# Patient Record
Sex: MALE | Race: WHITE | NOT HISPANIC OR LATINO | URBAN - METROPOLITAN AREA
[De-identification: names, ages, dates, MRNs, and addresses within clinical notes are randomized per-mention and may not be internally consistent; named-entity substitution may affect disease eponyms.]

---

## 2022-11-22 ENCOUNTER — HOSPITAL ENCOUNTER (EMERGENCY)
Facility: HOSPITAL | Age: 77
Discharge: LEFT BEFORE DISCHARGE | End: 2022-11-23
Attending: SPECIALIST
Payer: MEDICARE

## 2022-11-22 DIAGNOSIS — R07.89 CHEST WALL PAIN: ICD-10-CM

## 2022-11-22 DIAGNOSIS — K85.90 ACUTE PANCREATITIS WITHOUT INFECTION OR NECROSIS, UNSPECIFIED PANCREATITIS TYPE: Primary | ICD-10-CM

## 2022-11-22 LAB
ALBUMIN SERPL-MCNC: 3.9 GM/DL (ref 3.4–4.8)
ALBUMIN/GLOB SERPL: 1.2 RATIO (ref 1.1–2)
ALP SERPL-CCNC: 110 UNIT/L (ref 40–150)
ALT SERPL-CCNC: 115 UNIT/L (ref 0–55)
AST SERPL-CCNC: 139 UNIT/L (ref 5–34)
BASOPHILS # BLD AUTO: 0.05 X10(3)/MCL (ref 0–0.2)
BASOPHILS NFR BLD AUTO: 0.3 %
BILIRUBIN DIRECT+TOT PNL SERPL-MCNC: 1.5 MG/DL
BUN SERPL-MCNC: 20 MG/DL (ref 8.4–25.7)
CALCIUM SERPL-MCNC: 9.5 MG/DL (ref 8.8–10)
CHLORIDE SERPL-SCNC: 106 MMOL/L (ref 98–107)
CO2 SERPL-SCNC: 24 MMOL/L (ref 23–31)
CREAT SERPL-MCNC: 1.38 MG/DL (ref 0.73–1.18)
EOSINOPHIL # BLD AUTO: 0.16 X10(3)/MCL (ref 0–0.9)
EOSINOPHIL NFR BLD AUTO: 0.9 %
ERYTHROCYTE [DISTWIDTH] IN BLOOD BY AUTOMATED COUNT: 13.5 % (ref 11.5–17)
GFR SERPLBLD CREATININE-BSD FMLA CKD-EPI: 53 MLS/MIN/1.73/M2
GLOBULIN SER-MCNC: 3.2 GM/DL (ref 2.4–3.5)
GLUCOSE SERPL-MCNC: 221 MG/DL (ref 82–115)
HCT VFR BLD AUTO: 44.8 % (ref 42–52)
HGB BLD-MCNC: 14.8 GM/DL (ref 14–18)
IMM GRANULOCYTES # BLD AUTO: 0.06 X10(3)/MCL (ref 0–0.04)
IMM GRANULOCYTES NFR BLD AUTO: 0.3 %
LYMPHOCYTES # BLD AUTO: 1.89 X10(3)/MCL (ref 0.6–4.6)
LYMPHOCYTES NFR BLD AUTO: 10.8 %
MCH RBC QN AUTO: 30.1 PG (ref 27–31)
MCHC RBC AUTO-ENTMCNC: 33 MG/DL (ref 33–36)
MCV RBC AUTO: 91.2 FL (ref 80–94)
MONOCYTES # BLD AUTO: 1.17 X10(3)/MCL (ref 0.1–1.3)
MONOCYTES NFR BLD AUTO: 6.7 %
NEUTROPHILS # BLD AUTO: 14.2 X10(3)/MCL (ref 2.1–9.2)
NEUTROPHILS NFR BLD AUTO: 81 %
PLATELET # BLD AUTO: 280 X10(3)/MCL (ref 130–400)
PMV BLD AUTO: 10 FL (ref 7.4–10.4)
POC CARDIAC TROPONIN I: 0 NG/ML (ref 0–0.08)
POTASSIUM SERPL-SCNC: 3.9 MMOL/L (ref 3.5–5.1)
PROT SERPL-MCNC: 7.1 GM/DL (ref 5.8–7.6)
RBC # BLD AUTO: 4.91 X10(6)/MCL (ref 4.7–6.1)
SAMPLE: NORMAL
SODIUM SERPL-SCNC: 142 MMOL/L (ref 136–145)
WBC # SPEC AUTO: 17.5 X10(3)/MCL (ref 4.5–11.5)

## 2022-11-22 PROCEDURE — 96375 TX/PRO/DX INJ NEW DRUG ADDON: CPT

## 2022-11-22 PROCEDURE — 80053 COMPREHEN METABOLIC PANEL: CPT | Performed by: SPECIALIST

## 2022-11-22 PROCEDURE — 25000003 PHARM REV CODE 250: Performed by: SPECIALIST

## 2022-11-22 PROCEDURE — 96374 THER/PROPH/DIAG INJ IV PUSH: CPT | Mod: 59

## 2022-11-22 PROCEDURE — 99285 EMERGENCY DEPT VISIT HI MDM: CPT | Mod: 25

## 2022-11-22 PROCEDURE — 63600175 PHARM REV CODE 636 W HCPCS: Performed by: SPECIALIST

## 2022-11-22 PROCEDURE — 85025 COMPLETE CBC W/AUTO DIFF WBC: CPT | Performed by: SPECIALIST

## 2022-11-22 PROCEDURE — 93010 EKG 12-LEAD: ICD-10-PCS | Mod: ,,, | Performed by: INTERNAL MEDICINE

## 2022-11-22 PROCEDURE — 83690 ASSAY OF LIPASE: CPT | Performed by: SPECIALIST

## 2022-11-22 PROCEDURE — 93005 ELECTROCARDIOGRAM TRACING: CPT

## 2022-11-22 PROCEDURE — 84484 ASSAY OF TROPONIN QUANT: CPT

## 2022-11-22 PROCEDURE — 93010 ELECTROCARDIOGRAM REPORT: CPT | Mod: ,,, | Performed by: INTERNAL MEDICINE

## 2022-11-22 PROCEDURE — 96361 HYDRATE IV INFUSION ADD-ON: CPT

## 2022-11-22 RX ORDER — ONDANSETRON 2 MG/ML
4 INJECTION INTRAMUSCULAR; INTRAVENOUS
Status: DISCONTINUED | OUTPATIENT
Start: 2022-11-22 | End: 2022-11-22

## 2022-11-22 RX ORDER — NAPROXEN SODIUM 220 MG/1
324 TABLET, FILM COATED ORAL ONCE
Status: COMPLETED | OUTPATIENT
Start: 2022-11-23 | End: 2022-11-22

## 2022-11-22 RX ORDER — MORPHINE SULFATE 4 MG/ML
4 INJECTION, SOLUTION INTRAMUSCULAR; INTRAVENOUS
Status: COMPLETED | OUTPATIENT
Start: 2022-11-22 | End: 2022-11-23

## 2022-11-22 RX ORDER — ONDANSETRON 2 MG/ML
4 INJECTION INTRAMUSCULAR; INTRAVENOUS
Status: COMPLETED | OUTPATIENT
Start: 2022-11-22 | End: 2022-11-22

## 2022-11-22 RX ADMIN — ONDANSETRON HYDROCHLORIDE 4 MG: 2 SOLUTION INTRAMUSCULAR; INTRAVENOUS at 11:11

## 2022-11-22 RX ADMIN — SODIUM CHLORIDE 1000 ML: 9 INJECTION, SOLUTION INTRAVENOUS at 11:11

## 2022-11-22 RX ADMIN — ASPIRIN 81 MG 324 MG: 81 TABLET ORAL at 11:11

## 2022-11-23 VITALS
HEART RATE: 63 BPM | OXYGEN SATURATION: 88 % | TEMPERATURE: 98 F | SYSTOLIC BLOOD PRESSURE: 123 MMHG | DIASTOLIC BLOOD PRESSURE: 64 MMHG | WEIGHT: 206 LBS | BODY MASS INDEX: 29.49 KG/M2 | RESPIRATION RATE: 20 BRPM | HEIGHT: 70 IN

## 2022-11-23 LAB — LIPASE SERPL-CCNC: >3000 U/L

## 2022-11-23 PROCEDURE — 63600175 PHARM REV CODE 636 W HCPCS: Performed by: SPECIALIST

## 2022-11-23 PROCEDURE — 25000003 PHARM REV CODE 250: Performed by: SPECIALIST

## 2022-11-23 PROCEDURE — 25500020 PHARM REV CODE 255: Performed by: SPECIALIST

## 2022-11-23 RX ORDER — AMLODIPINE BESYLATE 5 MG/1
5 TABLET ORAL DAILY
Status: ON HOLD | COMMUNITY
Start: 2022-11-21 | End: 2022-11-30 | Stop reason: HOSPADM

## 2022-11-23 RX ORDER — HYDROMORPHONE HYDROCHLORIDE 2 MG/ML
1 INJECTION, SOLUTION INTRAMUSCULAR; INTRAVENOUS; SUBCUTANEOUS
Status: DISCONTINUED | OUTPATIENT
Start: 2022-11-23 | End: 2022-11-23 | Stop reason: HOSPADM

## 2022-11-23 RX ORDER — HYDROCODONE BITARTRATE AND ACETAMINOPHEN 10; 325 MG/1; MG/1
1 TABLET ORAL EVERY 6 HOURS PRN
Qty: 15 TABLET | Refills: 0 | Status: SHIPPED | OUTPATIENT
Start: 2022-11-23

## 2022-11-23 RX ORDER — SIMVASTATIN 40 MG/1
40 TABLET, FILM COATED ORAL DAILY
Status: ON HOLD | COMMUNITY
Start: 2022-09-23 | End: 2022-11-30 | Stop reason: SDUPTHER

## 2022-11-23 RX ORDER — ONDANSETRON 4 MG/1
4 TABLET, FILM COATED ORAL EVERY 6 HOURS
Qty: 12 TABLET | Refills: 0 | Status: ON HOLD | OUTPATIENT
Start: 2022-11-23 | End: 2022-11-30 | Stop reason: HOSPADM

## 2022-11-23 RX ORDER — TAMSULOSIN HYDROCHLORIDE 0.4 MG/1
1 CAPSULE ORAL DAILY
Status: ON HOLD | COMMUNITY
Start: 2022-07-22 | End: 2022-11-30 | Stop reason: SDUPTHER

## 2022-11-23 RX ADMIN — IOPAMIDOL 100 ML: 755 INJECTION, SOLUTION INTRAVENOUS at 12:11

## 2022-11-23 RX ADMIN — MORPHINE SULFATE 4 MG: 4 INJECTION, SOLUTION INTRAMUSCULAR; INTRAVENOUS at 12:11

## 2022-11-23 NOTE — ED PROVIDER NOTES
Encounter Date: 11/22/2022       History     Chief Complaint   Patient presents with    Chest Pain     Amb to ED 2 c/o chest pain describes as pressure started 1 pm constant nausea vomiting abd pain denies diaphoresis skin warm dry color normal respirations unlabored      Patient reports lower bilateral chest pressure followed by nausea and vomiting after eating a fried Spam sandwich; he states he believes the grease was old because his wife said she thought it was rancid; he denies radiation of the pain, no palpitations, no shortness of breath, no diaphoresis; no history of coronary artery disease; patient has a history of hypertension, hypercholesterolemia, renal cell carcinoma with nephrectomy in 2019 and BPH      Review of patient's allergies indicates:   Allergen Reactions    Aspirin      asthma     No past medical history on file.  No past surgical history on file.  No family history on file.     Review of Systems   Constitutional: Negative.    HENT: Negative.     Respiratory: Negative.     Cardiovascular: Negative.    Gastrointestinal: Negative.    Genitourinary: Negative.    Musculoskeletal: Negative.    Neurological: Negative.      Physical Exam     Initial Vitals   BP Pulse Resp Temp SpO2   11/22/22 2259 11/22/22 2259 11/22/22 2259 11/22/22 2301 11/22/22 2259   (!) 158/85 65 16 97.6 °F (36.4 °C) 96 %      MAP       --                Physical Exam    Nursing note and vitals reviewed.  Constitutional: He appears well-developed and well-nourished.   HENT:   Head: Normocephalic and atraumatic.   Mouth/Throat: Oropharynx is clear and moist.   Eyes: EOM are normal. Pupils are equal, round, and reactive to light.   Neck: Neck supple.   Normal range of motion.  Cardiovascular:  Normal rate, regular rhythm and normal heart sounds.           No murmur heard.  Pulmonary/Chest: Breath sounds normal. He has no wheezes.   Abdominal: Abdomen is soft. Bowel sounds are normal. He exhibits no distension. There is abdominal  tenderness (Mild-to-moderate bilateral upper quadrants). There is no rebound and no guarding.   Musculoskeletal:         General: Normal range of motion.      Cervical back: Normal range of motion and neck supple.     Neurological: He is alert and oriented to person, place, and time.   Skin: Skin is warm and dry.       ED Course   Procedures  Labs Reviewed   COMPREHENSIVE METABOLIC PANEL - Abnormal; Notable for the following components:       Result Value    Glucose Level 221 (*)     Creatinine 1.38 (*)     Alanine Aminotransferase 115 (*)     Aspartate Aminotransferase 139 (*)     All other components within normal limits   LIPASE - Abnormal; Notable for the following components:    Lipase Level >3,000 (*)     All other components within normal limits   CBC WITH DIFFERENTIAL - Abnormal; Notable for the following components:    WBC 17.5 (*)     Neut # 14.2 (*)     IG# 0.06 (*)     All other components within normal limits   CBC W/ AUTO DIFFERENTIAL    Narrative:     The following orders were created for panel order CBC auto differential.  Procedure                               Abnormality         Status                     ---------                               -----------         ------                     CBC with Differential[[id number]]        Abnormal            Final result                 Please view results for these tests on the individual orders.   TROPONIN ISTAT   POCT TROPONIN     EKG Readings: (Independently Interpreted)   Rhythm: Normal Sinus Rhythm. Heart Rate: 67. Ectopy: No Ectopy. ST Segments: Normal ST Segments.   Low-voltage QRS, inferior infarct age undetermined     Imaging Results              CT Abdomen Pelvis With Contrast (Preliminary result)  Result time 11/23/22 01:36:06      Preliminary result by Jaquan Donis Jr., MD (11/23/22 01:36:06)                   Narrative:    START OF REPORT:  Technique: CT of the abdomen and pelvis was performed with axial images as well as sagittal and  coronal reconstruction images with intravenous contrast.    Comparison: None available.    Clinical History: C/o chest pain describes as pressure started 1 pm constant nausea vomiting abd pain denies diaphoresis skin warm dry color normal respirations unlabored ) hx kidney removed due to kidney cancer.    Dosage Information: Automated Exposure Control was utilized.    Findings:  Thorax:  Lungs: There is nonspecific dependent change at the lung bases.  Pleura: No effusions or thickening.  Heart: The heart size is within normal limits.  Abdomen:  Abdominal Wall: No abdominal wall pathology is seen.  Liver: The liver appears unremarkable.  Biliary System: No intrahepatic or extrahepatic biliary duct dilatation is seen.  Gallbladder: The gallbladder appears unremarkable.  Pancreas: Edematous pancreas with surrounding peripancreatic fat stranding. This is consistent with moderate acute pancreatitis. No pancreatic pseudocyst is identified.  Spleen: The spleen appears unremarkable.  Adrenals: The adrenal glands appear unremarkable.  Kidneys: A single cyst measuring 2.4 x 3 cm is seen on series 3, image 46 in the lower pole of the left kidney. The left kidney otherwise appears unremarkable. The right kidney is surgically absent.  Aorta: There is moderate calcification of the abdominal aorta and its branches.  IVC: Unremarkable.  Bowel:  Esophagus: The visualized esophagus appears unremarkable.  Stomach: The stomach appears unremarkable.  Duodenum: Unremarkable appearing duodenum.  Small Bowel: The small bowel appears unremarkable.  Colon: Nondistended.  Appendix: The appendix appears unremarkable.  Peritoneum: No intraperitoneal free air or ascites is seen.    Pelvis:  Bladder: The bladder appears unremarkable.  Male:  Prostate gland: The prostate gland appears unremarkable.    Bony structures:  Dorsal Spine: There is mild spondylosis of the visualized dorsal spine.      Impression:  1. Edematous pancreas with surrounding  peripancreatic fat stranding. This is consistent with moderate acute pancreatitis. No pancreatic pseudocyst is identified. Correlate with clinical and laboratory findings as regards further evaluation and followup.  2. The right kidney is surgically absent.  3. Details as above.                          Preliminary result by Ecal, Rad Results In (11/23/22 01:36:06)                   Narrative:    START OF REPORT:  Technique: CT of the abdomen and pelvis was performed with axial images as well as sagittal and coronal reconstruction images with intravenous contrast.    Comparison: None available.    Clinical History: C/o chest pain describes as pressure started 1 pm constant nausea vomiting abd pain denies diaphoresis skin warm dry color normal respirations unlabored ) hx kidney removed due to kidney cancer.    Dosage Information: Automated Exposure Control was utilized.    Findings:  Thorax:  Lungs: There is nonspecific dependent change at the lung bases.  Pleura: No effusions or thickening.  Heart: The heart size is within normal limits.  Abdomen:  Abdominal Wall: No abdominal wall pathology is seen.  Liver: The liver appears unremarkable.  Biliary System: No intrahepatic or extrahepatic biliary duct dilatation is seen.  Gallbladder: The gallbladder appears unremarkable.  Pancreas: Edematous pancreas with surrounding peripancreatic fat stranding. This is consistent with moderate acute pancreatitis. No pancreatic pseudocyst is identified.  Spleen: The spleen appears unremarkable.  Adrenals: The adrenal glands appear unremarkable.  Kidneys: A single cyst measuring 2.4 x 3 cm is seen on series 3, image 46 in the lower pole of the left kidney. The left kidney otherwise appears unremarkable. The right kidney is surgically absent.  Aorta: There is moderate calcification of the abdominal aorta and its branches.  IVC: Unremarkable.  Bowel:  Esophagus: The visualized esophagus appears unremarkable.  Stomach: The stomach  appears unremarkable.  Duodenum: Unremarkable appearing duodenum.  Small Bowel: The small bowel appears unremarkable.  Colon: Nondistended.  Appendix: The appendix appears unremarkable.  Peritoneum: No intraperitoneal free air or ascites is seen.    Pelvis:  Bladder: The bladder appears unremarkable.  Male:  Prostate gland: The prostate gland appears unremarkable.    Bony structures:  Dorsal Spine: There is mild spondylosis of the visualized dorsal spine.      Impression:  1. Edematous pancreas with surrounding peripancreatic fat stranding. This is consistent with moderate acute pancreatitis. No pancreatic pseudocyst is identified. Correlate with clinical and laboratory findings as regards further evaluation and followup.  2. The right kidney is surgically absent.  3. Details as above.                                         X-Ray Chest AP Portable (Preliminary result)  Result time 11/22/22 23:51:41      ED Interpretation by Payam Eid MD (11/22/22 23:51:41, [facility] - Emergency Dept, Emergency Medicine)    No acute pulmonary process                                     Medications   HYDROmorphone (PF) injection 1 mg (has no administration in time range)   ondansetron injection 4 mg (4 mg Intravenous Given 11/22/22 2308)   sodium chloride 0.9% bolus 1,000 mL (0 mLs Intravenous Stopped 11/23/22 0044)   aspirin chewable tablet 324 mg (324 mg Oral Given 11/22/22 2319)   morphine injection 4 mg (4 mg Intravenous Given 11/23/22 0051)   iopamidoL (ISOVUE-370) injection 100 mL (100 mLs Intravenous Given 11/23/22 0038)     Medical Decision Making:   Differential Diagnosis:   Gastroenteritis; Bowel obstruction; Electrolyte abnormality; Dehydration; Gastritis; GERD; PUD; Viral illness; AMI; biliary disease; hepatitis  ED Management:  Patient's initial EKG revealed normal sinus rhythm and no ST elevation; he was given 324 mg of chewable baby aspirin and 4 mg of Zofran IV; labs drawn; patient's labs revealed  "pancreatitis; he agreed to morphine and was given 4 mg IV with some improvement in his pain; his nausea was controlled with Zofran IV 4 mg; he felt like his pain had resurfaced and was offered Dilaudid 1 mg but refused; I encouraged patient to be admitted for pain management and antiemetics but patient refused against medical advice; he understands the risk and benefit of going home and agreed to return if symptoms progressed               Patient Vitals for the past 24 hrs:   BP Temp Temp src Pulse Resp SpO2 Height Weight   11/23/22 0102 124/69 -- -- 70 (!) 21 (!) 93 % -- --   11/23/22 0051 -- -- -- -- 20 -- -- --   11/22/22 2332 (!) 164/91 -- -- 69 17 (!) 93 % -- --   11/22/22 2301 -- 97.6 °F (36.4 °C) Oral -- -- -- -- --   11/22/22 2259 (!) 158/85 -- -- 65 16 96 % 5' 10" (1.778 m) 93.4 kg (206 lb)   Improved, refuses admission           Clinical Impression:   Final diagnoses:  [R07.89] Chest wall pain  [K85.90] Acute pancreatitis without infection or necrosis, unspecified pancreatitis type (Primary)        ED Disposition Condition    AMA Stable                Payam Eid MD  11/23/22 0252    "

## 2022-11-25 ENCOUNTER — HOSPITAL ENCOUNTER (INPATIENT)
Facility: HOSPITAL | Age: 77
LOS: 4 days | Discharge: HOME | DRG: 417 | End: 2022-11-30
Attending: EMERGENCY MEDICINE | Admitting: INTERNAL MEDICINE
Payer: MEDICARE

## 2022-11-25 ENCOUNTER — HOSPITAL ENCOUNTER (EMERGENCY)
Facility: HOSPITAL | Age: 77
Discharge: ANOTHER HOSPITAL | End: 2022-11-25
Attending: EMERGENCY MEDICINE
Payer: MEDICARE

## 2022-11-25 VITALS
OXYGEN SATURATION: 99 % | TEMPERATURE: 99 F | SYSTOLIC BLOOD PRESSURE: 171 MMHG | BODY MASS INDEX: 29.2 KG/M2 | HEART RATE: 85 BPM | WEIGHT: 204 LBS | RESPIRATION RATE: 18 BRPM | HEIGHT: 70 IN | DIASTOLIC BLOOD PRESSURE: 94 MMHG

## 2022-11-25 DIAGNOSIS — K81.0 ACUTE CHOLECYSTITIS: Primary | ICD-10-CM

## 2022-11-25 DIAGNOSIS — K82.8 GALLBLADDER MASS: ICD-10-CM

## 2022-11-25 DIAGNOSIS — K81.0 ACUTE CHOLECYSTITIS: ICD-10-CM

## 2022-11-25 LAB
ALBUMIN SERPL-MCNC: 3 GM/DL (ref 3.4–4.8)
ALBUMIN/GLOB SERPL: 0.8 RATIO (ref 1.1–2)
ALP SERPL-CCNC: 85 UNIT/L (ref 40–150)
ALT SERPL-CCNC: 66 UNIT/L (ref 0–55)
AST SERPL-CCNC: 27 UNIT/L (ref 5–34)
BASOPHILS # BLD AUTO: 0.02 X10(3)/MCL (ref 0–0.2)
BASOPHILS NFR BLD AUTO: 0.1 %
BILIRUBIN DIRECT+TOT PNL SERPL-MCNC: 1.5 MG/DL
BUN SERPL-MCNC: 19.3 MG/DL (ref 8.4–25.7)
CALCIUM SERPL-MCNC: 9.2 MG/DL (ref 8.8–10)
CHLORIDE SERPL-SCNC: 102 MMOL/L (ref 98–107)
CO2 SERPL-SCNC: 27 MMOL/L (ref 23–31)
CREAT SERPL-MCNC: 1.02 MG/DL (ref 0.73–1.18)
EOSINOPHIL # BLD AUTO: 0.02 X10(3)/MCL (ref 0–0.9)
EOSINOPHIL NFR BLD AUTO: 0.1 %
ERYTHROCYTE [DISTWIDTH] IN BLOOD BY AUTOMATED COUNT: 14.3 % (ref 11.5–17)
GFR SERPLBLD CREATININE-BSD FMLA CKD-EPI: >60 MLS/MIN/1.73/M2
GLOBULIN SER-MCNC: 3.9 GM/DL (ref 2.4–3.5)
GLUCOSE SERPL-MCNC: 165 MG/DL (ref 82–115)
HCT VFR BLD AUTO: 44.6 % (ref 42–52)
HGB BLD-MCNC: 14.3 GM/DL (ref 14–18)
IMM GRANULOCYTES # BLD AUTO: 0.09 X10(3)/MCL (ref 0–0.04)
IMM GRANULOCYTES NFR BLD AUTO: 0.5 %
LACTATE SERPL-SCNC: 1.2 MMOL/L (ref 0.5–2.2)
LIPASE SERPL-CCNC: 41 U/L
LYMPHOCYTES # BLD AUTO: 1.25 X10(3)/MCL (ref 0.6–4.6)
LYMPHOCYTES NFR BLD AUTO: 6.4 %
MCH RBC QN AUTO: 29.9 PG (ref 27–31)
MCHC RBC AUTO-ENTMCNC: 32.1 MG/DL (ref 33–36)
MCV RBC AUTO: 93.1 FL (ref 80–94)
MONOCYTES # BLD AUTO: 1.7 X10(3)/MCL (ref 0.1–1.3)
MONOCYTES NFR BLD AUTO: 8.7 %
NEUTROPHILS # BLD AUTO: 16.4 X10(3)/MCL (ref 2.1–9.2)
NEUTROPHILS NFR BLD AUTO: 84.2 %
PLATELET # BLD AUTO: 328 X10(3)/MCL (ref 130–400)
PMV BLD AUTO: 11.7 FL (ref 7.4–10.4)
POTASSIUM SERPL-SCNC: 4.2 MMOL/L (ref 3.5–5.1)
PROT SERPL-MCNC: 6.9 GM/DL (ref 5.8–7.6)
RBC # BLD AUTO: 4.79 X10(6)/MCL (ref 4.7–6.1)
SODIUM SERPL-SCNC: 139 MMOL/L (ref 136–145)
WBC # SPEC AUTO: 19.5 X10(3)/MCL (ref 4.5–11.5)

## 2022-11-25 PROCEDURE — 80053 COMPREHEN METABOLIC PANEL: CPT | Performed by: PHYSICIAN ASSISTANT

## 2022-11-25 PROCEDURE — 83605 ASSAY OF LACTIC ACID: CPT | Performed by: EMERGENCY MEDICINE

## 2022-11-25 PROCEDURE — 85025 COMPLETE CBC W/AUTO DIFF WBC: CPT | Performed by: PHYSICIAN ASSISTANT

## 2022-11-25 PROCEDURE — 25500020 PHARM REV CODE 255: Performed by: EMERGENCY MEDICINE

## 2022-11-25 PROCEDURE — 83690 ASSAY OF LIPASE: CPT | Performed by: PHYSICIAN ASSISTANT

## 2022-11-25 PROCEDURE — 96361 HYDRATE IV INFUSION ADD-ON: CPT

## 2022-11-25 PROCEDURE — 99285 EMERGENCY DEPT VISIT HI MDM: CPT | Mod: 25

## 2022-11-25 PROCEDURE — 96365 THER/PROPH/DIAG IV INF INIT: CPT | Mod: 59

## 2022-11-25 PROCEDURE — 63600175 PHARM REV CODE 636 W HCPCS: Performed by: EMERGENCY MEDICINE

## 2022-11-25 PROCEDURE — 25000003 PHARM REV CODE 250: Performed by: EMERGENCY MEDICINE

## 2022-11-25 PROCEDURE — 99285 EMERGENCY DEPT VISIT HI MDM: CPT | Mod: 25,27

## 2022-11-25 PROCEDURE — 96375 TX/PRO/DX INJ NEW DRUG ADDON: CPT

## 2022-11-25 PROCEDURE — 63600175 PHARM REV CODE 636 W HCPCS: Performed by: FAMILY MEDICINE

## 2022-11-25 RX ORDER — SODIUM CHLORIDE 9 MG/ML
1000 INJECTION, SOLUTION INTRAVENOUS
Status: COMPLETED | OUTPATIENT
Start: 2022-11-25 | End: 2022-11-25

## 2022-11-25 RX ORDER — MORPHINE SULFATE 4 MG/ML
4 INJECTION, SOLUTION INTRAMUSCULAR; INTRAVENOUS
Status: COMPLETED | OUTPATIENT
Start: 2022-11-25 | End: 2022-11-25

## 2022-11-25 RX ORDER — ONDANSETRON 2 MG/ML
4 INJECTION INTRAMUSCULAR; INTRAVENOUS
Status: COMPLETED | OUTPATIENT
Start: 2022-11-25 | End: 2022-11-25

## 2022-11-25 RX ADMIN — IOPAMIDOL 100 ML: 755 INJECTION, SOLUTION INTRAVENOUS at 03:11

## 2022-11-25 RX ADMIN — PIPERACILLIN AND TAZOBACTAM 4.5 G: 4; .5 INJECTION, POWDER, LYOPHILIZED, FOR SOLUTION INTRAVENOUS; PARENTERAL at 05:11

## 2022-11-25 RX ADMIN — ONDANSETRON 4 MG: 2 INJECTION INTRAMUSCULAR; INTRAVENOUS at 07:11

## 2022-11-25 RX ADMIN — SODIUM CHLORIDE 1000 ML: 9 INJECTION, SOLUTION INTRAVENOUS at 01:11

## 2022-11-25 RX ADMIN — MORPHINE SULFATE 4 MG: 4 INJECTION, SOLUTION INTRAMUSCULAR; INTRAVENOUS at 08:11

## 2022-11-25 RX ADMIN — SODIUM CHLORIDE 1000 ML: 9 INJECTION, SOLUTION INTRAVENOUS at 05:11

## 2022-11-25 NOTE — ED PROVIDER NOTES
Encounter Date: 11/25/2022       History     Chief Complaint   Patient presents with    Abdominal Pain     Pt reports RUQ/RLQ abd pain that began Tuesday. Pt reports being seen at [facility] ED that night due to vomiting and reports he was dx with pancreatitis. Pt reports he has still been vomiting and denies fever. Pt reports he still has gallbladder and appendix.     This 77-year-old man was seen on Tuesday and diagnosed with pancreatitis he was sent home on pain medication and oral hydration but he presents today with complaints of right-sided abdominal pain both upper and lower.  His wife states whenever he tries to take more than a few sips of water or Powerade he vomits.  He has not had a bowel movement since the beginning of this.       Review of patient's allergies indicates:   Allergen Reactions    Aspirin      asthma     History reviewed. No pertinent past medical history.  History reviewed. No pertinent surgical history.  History reviewed. No pertinent family history.     Review of Systems   Constitutional:  Negative for fever.   HENT:  Negative for sore throat.    Respiratory:  Negative for shortness of breath.    Cardiovascular:  Negative for chest pain.   Gastrointestinal:  Positive for abdominal pain, constipation and vomiting. Negative for nausea.   Genitourinary:  Negative for dysuria.   Musculoskeletal:  Negative for back pain.   Skin:  Negative for rash.   Neurological:  Negative for weakness.   Hematological:  Does not bruise/bleed easily.     Physical Exam     Initial Vitals [11/25/22 1213]   BP Pulse Resp Temp SpO2   (!) 172/82 72 20 98.1 °F (36.7 °C) (!) 94 %      MAP       --         Physical Exam    Nursing note and vitals reviewed.  Constitutional: He appears well-developed and well-nourished.   HENT:   Head: Normocephalic and atraumatic.   Mouth/Throat: Mucous membranes are normal.   Eyes: EOM are normal. Pupils are equal, round, and reactive to light.   Neck: Neck supple.   Normal range of  motion.  Cardiovascular:  Normal rate, regular rhythm, normal heart sounds and intact distal pulses.           Pulmonary/Chest: Breath sounds normal.   Abdominal: Abdomen is soft. Bowel sounds are normal. There is abdominal tenderness (RUQ). There is guarding.   Musculoskeletal:         General: Normal range of motion.      Cervical back: Normal range of motion and neck supple.     Neurological: He is alert and oriented to person, place, and time. He has normal strength.   Skin: Skin is warm and dry. Capillary refill takes less than 2 seconds.   Psychiatric: He has a normal mood and affect. His behavior is normal. Judgment and thought content normal.       ED Course   Procedures  Labs Reviewed   COMPREHENSIVE METABOLIC PANEL - Abnormal; Notable for the following components:       Result Value    Glucose Level 165 (*)     Albumin Level 3.0 (*)     Globulin 3.9 (*)     Albumin/Globulin Ratio 0.8 (*)     Alanine Aminotransferase 66 (*)     All other components within normal limits   CBC WITH DIFFERENTIAL - Abnormal; Notable for the following components:    WBC 19.5 (*)     MCHC 32.1 (*)     MPV 11.7 (*)     Neut # 16.4 (*)     Mono # 1.70 (*)     IG# 0.09 (*)     All other components within normal limits   LIPASE - Normal   LACTIC ACID, PLASMA - Normal   CBC W/ AUTO DIFFERENTIAL    Narrative:     The following orders were created for panel order CBC Auto Differential.  Procedure                               Abnormality         Status                     ---------                               -----------         ------                     CBC with Differential[[id number]]        Abnormal            Final result                 Please view results for these tests on the individual orders.          Imaging Results              US Abdomen Limited_Gallbladder (Final result)  Result time 11/25/22 16:37:12      Final result by Francine Ken MD (11/25/22 16:37:12)                   Impression:      Abnormal appearing  gallbladder.  There are some gallstones in the gallbladder and a questionable mass in the gallbladder.  Surgical consultation is recommended.    Hepatomegaly with findings consistent with hepatic steatosis    Pancreas is not visualized due to overlying bowel gas      Electronically signed by: Francine Ken  Date:    11/25/2022  Time:    16:37               Narrative:    EXAMINATION:  US ABDOMEN LIMITED_GALLBLADDER    CLINICAL HISTORY:  RUQ abdominal Pain;    TECHNIQUE:  Complete abdominal ultrasound (including pancreas, aorta, liver, gallbladder, common bile duct, IVC, kidneys, and spleen) was performed.    COMPARISON:  None    FINDINGS:  The pancreas is not seen due to overlying bowel gas.  The proximal aorta measures 2.1 cm.  Mid and distal aorta are not well seen but the distal aorta measures roughly 1.1 cm.  IVC is patent.  The liver is echogenic in appearance.  It is enlarged.  It measures 18.7 cm.  Findings are consistent with hepatic steatosis.  No liver mass or lesion is seen.  Portal and hepatic veins appear normal.  There are multiple gallstones seen in the gallbladder.  There is a questionable gallbladder wall mass seen on image number 45 of 63.  It is relatively avascular.  There is mixed echogenicity seen in the area and it measures 3.5 x 4.5 x 2.7 cm.  No pericholecystic fluid seen.    The right kidney has been surgically removed.                                       CT Abdomen Pelvis With Contrast (Final result)  Result time 11/25/22 15:25:34      Final result by Francine Ken MD (11/25/22 15:25:34)                   Impression:      Findings seen consistent with pancreatitis overall unchanged since prior examination with no evidence of necrotizing pancreatitis seen at this time    Hepatic steatosis    Small amount of perihepatic fluid seen which is a new finding.  Findings are most likely reactive    Bibasilar atelectasis    Left lower lobe punctate nodule for which 6-12 month  follow-up is recommended      Electronically signed by: Francine Ken  Date:    11/25/2022  Time:    15:25               Narrative:    EXAMINATION:  CT ABDOMEN PELVIS WITH CONTRAST    CLINICAL HISTORY:  Abdominal pain, acute, nonlocalized;    TECHNIQUE:  Low dose axial images, sagittal and coronal reformations were obtained from the lung bases to the pubic symphysis following the IV administration of contrast. Automatic exposure control (AEC) is utilized to reduce patient radiation exposure.    COMPARISON:  11/23/2022    FINDINGS:  There is bibasilar atelectasis..  There is a punctate pulmonary nodule seen in the left lower lobe which was described on the prior CT scan.    There is a small amount of perihepatic fluid seen.  There is evidence of hepatic steatosis.  No liver mass or lesion is seen.  Portal and hepatic veins appear normal.    The gallbladder appears normal.  No obvious gallstones are seen.  No biliary dilatation is seen.  No pericholecystic fluid is seen.    There are some inflammatory changes seen around the pancreas consistent with pancreatitis.  The pancreas enhances diffusely.  No evidence of necrotizing pancreatitis seen.  No evidence of a pseudocyst is seen at this time.  Inflammatory changes appears similar to the prior examination.  There are some concomitant inflammatory changes seen in the duodenum adjacent to the pancreatic head..    The spleen shows no acute abnormality.    The adrenal glands appear normal.  No adrenal nodule is seen.    The right kidney is surgically absent.  The left kidney appears normal in size.  There is a cyst in the left kidney in the midpole..  No hydronephrosis is seen.  No hydroureter is seen.  No nephrolithiasis is seen.  No obvious ureteral stones are seen.    Urinary bladder appears grossly unremarkable.    The prostate is mildly enlarged in size..    No colitis is seen.  No diverticulitis is seen.  No obvious colonic mass or lesion is seen.    No free air  is seen.  No free fluid is seen.                                       Medications   sodium chloride 0.9% bolus 1,000 mL (0 mLs Intravenous Stopped 11/25/22 1457)   iopamidoL (ISOVUE-370) injection 100 mL (100 mLs Intravenous Given 11/25/22 1519)   0.9%  NaCl infusion (1,000 mLs Intravenous New Bag 11/25/22 1722)   piperacillin-tazobactam (ZOSYN) 4.5 g in dextrose 5 % in water (D5W) 5 % 100 mL IVPB (MB+) (0 g Intravenous Stopped 11/25/22 1753)   ondansetron injection 4 mg (4 mg Intravenous Given 11/25/22 1923)   morphine injection 4 mg (4 mg Intravenous Given 11/25/22 2044)                              Clinical Impression:   Final diagnoses:  [K81.0] Acute cholecystitis (Primary)  [K82.8] Gallbladder mass      ED Disposition Condition    Transfer to Another Facility Stable                Jeff Cortes MD  11/25/22 1822       Jeff Cortes MD  11/25/22 4007

## 2022-11-26 LAB
CANCER AG19-9 SERPL-ACNC: 66.41 UNIT/ML (ref 0–37)
CEA SERPL-MCNC: <1.73 NG/ML (ref 0–3)
CRP SERPL-MCNC: >240 MG/L
LIPASE SERPL-CCNC: 51 U/L

## 2022-11-26 PROCEDURE — 63600175 PHARM REV CODE 636 W HCPCS: Performed by: EMERGENCY MEDICINE

## 2022-11-26 PROCEDURE — 21400001 HC TELEMETRY ROOM

## 2022-11-26 PROCEDURE — 25000003 PHARM REV CODE 250: Performed by: EMERGENCY MEDICINE

## 2022-11-26 PROCEDURE — 63600175 PHARM REV CODE 636 W HCPCS: Performed by: INTERNAL MEDICINE

## 2022-11-26 PROCEDURE — 63600175 PHARM REV CODE 636 W HCPCS

## 2022-11-26 PROCEDURE — 25000003 PHARM REV CODE 250: Performed by: INTERNAL MEDICINE

## 2022-11-26 PROCEDURE — 83690 ASSAY OF LIPASE: CPT | Performed by: INTERNAL MEDICINE

## 2022-11-26 PROCEDURE — 86301 IMMUNOASSAY TUMOR CA 19-9: CPT | Performed by: STUDENT IN AN ORGANIZED HEALTH CARE EDUCATION/TRAINING PROGRAM

## 2022-11-26 PROCEDURE — 11000001 HC ACUTE MED/SURG PRIVATE ROOM

## 2022-11-26 PROCEDURE — 86140 C-REACTIVE PROTEIN: CPT | Performed by: INTERNAL MEDICINE

## 2022-11-26 PROCEDURE — 82378 CARCINOEMBRYONIC ANTIGEN: CPT | Performed by: STUDENT IN AN ORGANIZED HEALTH CARE EDUCATION/TRAINING PROGRAM

## 2022-11-26 RX ORDER — MORPHINE SULFATE 4 MG/ML
1 INJECTION, SOLUTION INTRAMUSCULAR; INTRAVENOUS EVERY 4 HOURS PRN
Status: DISCONTINUED | OUTPATIENT
Start: 2022-11-26 | End: 2022-11-26

## 2022-11-26 RX ORDER — IBUPROFEN 200 MG
16 TABLET ORAL
Status: DISCONTINUED | OUTPATIENT
Start: 2022-11-26 | End: 2022-11-30 | Stop reason: HOSPADM

## 2022-11-26 RX ORDER — GLUCAGON 1 MG
1 KIT INJECTION
Status: DISCONTINUED | OUTPATIENT
Start: 2022-11-26 | End: 2022-11-30 | Stop reason: HOSPADM

## 2022-11-26 RX ORDER — HYDRALAZINE HYDROCHLORIDE 20 MG/ML
10 INJECTION INTRAMUSCULAR; INTRAVENOUS
Status: DISCONTINUED | OUTPATIENT
Start: 2022-11-26 | End: 2022-11-28

## 2022-11-26 RX ORDER — POLYETHYLENE GLYCOL 3350 17 G/17G
17 POWDER, FOR SOLUTION ORAL DAILY PRN
Status: DISCONTINUED | OUTPATIENT
Start: 2022-11-26 | End: 2022-11-30 | Stop reason: HOSPADM

## 2022-11-26 RX ORDER — ONDANSETRON 2 MG/ML
4 INJECTION INTRAMUSCULAR; INTRAVENOUS EVERY 4 HOURS PRN
Status: DISCONTINUED | OUTPATIENT
Start: 2022-11-26 | End: 2022-11-30 | Stop reason: HOSPADM

## 2022-11-26 RX ORDER — ATORVASTATIN CALCIUM 10 MG/1
10 TABLET, FILM COATED ORAL DAILY
Status: DISCONTINUED | OUTPATIENT
Start: 2022-11-27 | End: 2022-11-30 | Stop reason: HOSPADM

## 2022-11-26 RX ORDER — MORPHINE SULFATE 4 MG/ML
INJECTION, SOLUTION INTRAMUSCULAR; INTRAVENOUS
Status: COMPLETED
Start: 2022-11-26 | End: 2022-11-26

## 2022-11-26 RX ORDER — SODIUM CHLORIDE 0.9 % (FLUSH) 0.9 %
10 SYRINGE (ML) INJECTION EVERY 12 HOURS PRN
Status: DISCONTINUED | OUTPATIENT
Start: 2022-11-26 | End: 2022-11-30 | Stop reason: HOSPADM

## 2022-11-26 RX ORDER — ACETAMINOPHEN 325 MG/1
650 TABLET ORAL EVERY 8 HOURS PRN
Status: DISCONTINUED | OUTPATIENT
Start: 2022-11-26 | End: 2022-11-30 | Stop reason: HOSPADM

## 2022-11-26 RX ORDER — MORPHINE SULFATE 4 MG/ML
4 INJECTION, SOLUTION INTRAMUSCULAR; INTRAVENOUS EVERY 4 HOURS PRN
Status: DISCONTINUED | OUTPATIENT
Start: 2022-11-26 | End: 2022-11-30 | Stop reason: HOSPADM

## 2022-11-26 RX ORDER — ACETAMINOPHEN 325 MG/1
650 TABLET ORAL EVERY 4 HOURS PRN
Status: DISCONTINUED | OUTPATIENT
Start: 2022-11-26 | End: 2022-11-30 | Stop reason: HOSPADM

## 2022-11-26 RX ORDER — SODIUM CHLORIDE, SODIUM LACTATE, POTASSIUM CHLORIDE, CALCIUM CHLORIDE 600; 310; 30; 20 MG/100ML; MG/100ML; MG/100ML; MG/100ML
INJECTION, SOLUTION INTRAVENOUS CONTINUOUS
Status: DISCONTINUED | OUTPATIENT
Start: 2022-11-26 | End: 2022-11-29

## 2022-11-26 RX ORDER — AMLODIPINE BESYLATE 5 MG/1
5 TABLET ORAL DAILY
Status: DISCONTINUED | OUTPATIENT
Start: 2022-11-26 | End: 2022-11-26

## 2022-11-26 RX ORDER — IBUPROFEN 200 MG
24 TABLET ORAL
Status: DISCONTINUED | OUTPATIENT
Start: 2022-11-26 | End: 2022-11-30 | Stop reason: HOSPADM

## 2022-11-26 RX ADMIN — MORPHINE SULFATE: 4 INJECTION INTRAVENOUS at 03:11

## 2022-11-26 RX ADMIN — SODIUM CHLORIDE, POTASSIUM CHLORIDE, SODIUM LACTATE AND CALCIUM CHLORIDE: 600; 310; 30; 20 INJECTION, SOLUTION INTRAVENOUS at 06:11

## 2022-11-26 RX ADMIN — MORPHINE SULFATE 4 MG: 4 INJECTION INTRAVENOUS at 09:11

## 2022-11-26 RX ADMIN — PIPERACILLIN AND TAZOBACTAM 4.5 G: 4; .5 INJECTION, POWDER, LYOPHILIZED, FOR SOLUTION INTRAVENOUS; PARENTERAL at 09:11

## 2022-11-26 RX ADMIN — PIPERACILLIN AND TAZOBACTAM 4.5 G: 4; .5 INJECTION, POWDER, LYOPHILIZED, FOR SOLUTION INTRAVENOUS; PARENTERAL at 04:11

## 2022-11-26 RX ADMIN — AMLODIPINE BESYLATE 5 MG: 5 TABLET ORAL at 03:11

## 2022-11-26 RX ADMIN — PIPERACILLIN AND TAZOBACTAM 4.5 G: 4; .5 INJECTION, POWDER, LYOPHILIZED, FOR SOLUTION INTRAVENOUS; PARENTERAL at 01:11

## 2022-11-26 NOTE — CONSULTS
General/Acute Care Surgery   History and Physical     HD#0  POD#* No surgery found *    HPI  Blake Neville is a 77 y.o. male who presents to the emergency department with symptomatic cholelithiasis following recent pancreatitis episode early this week. US findings also showed concern for a possible gallbladder mass at OSH Patient reports that Tuesday he began to have sharp back pain that was extremely severe. He went to the ED and was diagnosed with pancreatitis. This pain improved until Friday afternoon when he began to have severe pain in this right upper quadrant following a fatty meal. The patient represented to the ED where he was found to have a leukocytosis and CT findings of pancreatitis, US findings of gallstones and possible mass in the gallbladder. Amylase and lipase have normalized since previous hospitalization. Patient has never had pancreatitis or pain like this before. Past medical history includes hypertension hyperlipidemia and right sided renal mass s/p nephrectomy. He has had ventral hernia repair and R inguinal hernia repair. He quit smoking 40 years ago and drinks approximately 1 beer a day.      Review of Systems: 10 point ROS negative except as stated in HPI       Objective  Temp:  [98.1 °F (36.7 °C)-98.7 °F (37.1 °C)] 98.2 °F (36.8 °C)  Pulse:  [67-85] 72  Resp:  [14-20] 14  SpO2:  [94 %-99 %] 95 %  BP: (168-189)/() 174/88      GEN: NAD   NEURO: AAOx3   HEENT: Acephalic   RESP: No increased WOB, equal rise and fall of the chest   CV: Regular rate   ABD: Soft, tender in RUQ, arreola sign negative non distended. EXT: Moving all extremities spontaneously      Labs  WBC: 19.5     Assessment/Plan  Blake Neville is a 77 y.o. male who presents to the emergency department with cholelithiasis and leukocytosis following recent pancreatitis episode early this week. US findings also showed concern for a possible gallbladder mass at OSH.     - Patient will need MRCP and possible ERCP prior to  cholecystectomy  - Medicine admit and GI consult  - Continue Zosyn   - surgery will continue to follow along       Kamini Bose MD  [employer] General Surgery    11/26/2022  1:16 AM

## 2022-11-26 NOTE — ED PROVIDER NOTES
Encounter Date: 11/25/2022    SCRIBE #1 NOTE: I, Janna Hood, am scribing for, and in the presence of,  Wanda Lindo MD. I have scribed the following portions of the note - Other sections scribed: HPI, ROS, PE.     History     Chief Complaint   Patient presents with    Abdominal Pain     [facility] Tx from [facility] for surgical services regarding acute cholecystitis w/ mass on gallbladder. Denies abd pain currently.      76 y/o male presents to ED via transfer from [facility] for surgical services regarding acute cholecystitis w/ mass on gallbladder. Pt reports having pancreatitis 2 days ago and that he vomited today. He notes R side abdominal pain, that his last bowel movement was a wk ago, and that he hasn't be able to eat. Pt's spouse states he also had a fever last night.     The history is provided by the patient and the spouse. No  was used.   Abdominal Pain  The current episode started unknown. The abdominal pain is located in the RUQ. The other symptoms of the illness include nausea and vomiting. The other symptoms of the illness do not include shortness of breath. Primary symptoms comment: decreased appetite.   Symptoms associated with the illness do not include chills, diaphoresis, constipation, hematuria or back pain.   Review of patient's allergies indicates:   Allergen Reactions    Aspirin      asthma     History reviewed. No pertinent past medical history.  No past surgical history on file.  No family history on file.  Social History     Substance Use Topics    Alcohol use: Not Currently    Drug use: Not Currently     Review of Systems   Constitutional:  Positive for appetite change (decreased). Negative for chills and diaphoresis.   HENT:  Negative for congestion, ear pain, sinus pain and sore throat.    Eyes:  Negative for pain, discharge and visual disturbance.   Respiratory:  Negative for cough, shortness of breath, wheezing and stridor.    Cardiovascular:  Negative for chest pain and  palpitations.   Gastrointestinal:  Positive for abdominal pain, nausea and vomiting. Negative for constipation and rectal pain.   Genitourinary:  Negative for hematuria.   Musculoskeletal:  Negative for back pain and myalgias.   Skin:  Negative for rash.   Neurological:  Negative for dizziness, syncope, numbness and headaches.   Hematological: Negative.    Psychiatric/Behavioral: Negative.     All other systems reviewed and are negative.    Physical Exam     Initial Vitals [11/25/22 2317]   BP Pulse Resp Temp SpO2   (!) 174/88 72 14 98.2 °F (36.8 °C) 95 %      MAP       --         Physical Exam    Nursing note and vitals reviewed.  Constitutional: He appears well-developed and well-nourished. He is not diaphoretic. No distress.   HENT:   Head: Normocephalic and atraumatic.   Nose: Nose normal.   Mouth/Throat: Oropharynx is clear and moist.   Eyes: Conjunctivae and EOM are normal. Pupils are equal, round, and reactive to light.   Neck: Trachea normal. Neck supple.   Normal range of motion.  Cardiovascular:  Normal rate, regular rhythm, normal heart sounds and intact distal pulses.     Exam reveals no gallop and no friction rub.       No murmur heard.  Pulmonary/Chest: Breath sounds normal. No respiratory distress. He has no wheezes. He has no rhonchi. He has no rales. He exhibits no tenderness.   Abdominal: Abdomen is soft. Bowel sounds are normal. He exhibits no distension and no mass. There is abdominal tenderness (RUQ). There is no rebound.   Musculoskeletal:         General: No tenderness or edema. Normal range of motion.      Cervical back: Normal range of motion and neck supple.      Lumbar back: Normal. No tenderness. Normal range of motion.     Neurological: He is alert and oriented to person, place, and time. He has normal strength. No cranial nerve deficit or sensory deficit.   Skin: Skin is warm and dry. Capillary refill takes less than 2 seconds. No rash and no abscess noted. No erythema. No pallor.    Psychiatric: He has a normal mood and affect. His behavior is normal. Judgment and thought content normal.       ED Course   Procedures  Labs Reviewed - No data to display       Imaging Results              MRI MRCP Without Contrast (Final result)  Result time 11/26/22 10:41:37      Final result by Prabhakar Nichols MD (11/26/22 10:41:37)                   Impression:      1. Mild nonspecific gallbladder wall edema and trace pericholecystic fluid with no gallstones seen.  2. Mild extrahepatic biliary ductal dilatation.  A filling defect within the proximal common bile duct is favored artifactual.  No distal common bile duct filling defects seen.  3. Mild acute interstitial pancreatitis.  4. Trace ascites.      Electronically signed by: Prabhakar Nichols  Date:    11/26/2022  Time:    10:41               Narrative:    EXAMINATION:  MRI ABDOMEN WITHOUT CONTRAST MRCP    CLINICAL HISTORY:  r/o biliary obstx;    TECHNIQUE:  Multiplanar multisequence MRI of the abdomen performed without gadolinium based IV contrast according to MRCP protocol.    COMPARISON:  CT yesterday    FINDINGS:  Liver: The liver is mildly enlarged and there is hepatic steatosis.  Suspect a miniscule cyst of the right hepatic lobe on image 21 series 5.    Gallbladder and biliary tree: No convincing gallstones.  There is some gallbladder sludge.  Mild gallbladder wall edema.  Trace pericholecystic fluid.  The common bile duct is mildly dilated measuring up to 11 mm.  There is some gradual tapering towards the ampulla.  Filling defect proximal common bile duct only seen on some sequences.  It is not seen on the T2 sequences.  This may be artifactual.  Otherwise no suspicious common bile duct filling defect.    Spleen: Normal.    Pancreas: Minimal dilatation of the main pancreatic duct throughout.  Mild peripancreatic stranding.  No organized peripancreatic collection.    Adrenals: Normal.    Kidneys: Right kidney absent.  No hydronephrosis left kidney.   There is perinephric stranding.    GI tract: No obstruction.  Similar stranding along the root of the mesentery.    Retroperitoneum: No abdominal aortic aneurysm.  No retroperitoneal adenopathy.    Trace ascites.                        Preliminary result by Prabhakar Nichols MD (11/26/22 06:55:56)                   Narrative:    START OF REPORT:  Technique: Magnetic resonance imaging of the abdomen without intravenous contrast.    Comparison: Comparison is with study dated â2022-11-25 15:15:43â.    Clinical History: RUQ pain, cholelithiasis.    Findings:  Pleura: There are small bilateral pleural effusions.  Heart: Mild cardiomegaly is seen.  Abdomen:  Abdominal Wall: No abdominal wall pathology is seen.  Liver: The liver appears unremarkable except for a tiny T2 hyperintense cyst in segment 4. No intrahepatic bile duct dilatation is seen in the liver.  Biliary System: Mild extrahepatic biliary duct dilatation is seen. The CBD measures 13 mm in maximum diameter with sludge like dependent densities throughout the duct. A partial filling defect attached to the anterior wall is seen in proximal common bile duct raising possibility of sloughed mucosa.  Gallbladder: There is moderate gallbladder distension with mild edematous thickening of the bladder walls measuring 5 mm and thin pericholecystic fluid. There is heterogenous signal within the gall bladder raising possibility of sludge.No gallstones are identified. These findings are concerning for cholecystitis.  Pancreas: The pancreas appears unremarkable.  Spleen: The spleen appears unremarkable.  Adrenals: The adrenal glands appear unremarkable.  Kidneys: The right kidney is not visualised as in prior CT. There is extensive left perinephric fat stranding with fluid in perinephric and posterior pararenal fascia .  Peritoneum: There is minimal free fluid in perihepatic and bilateral paracolic gutters.      Impression:  1. Mild extrahepatic biliary duct dilatation is  seen. The CBD measures 13 mm in maximum diameter with sludge like dependent densities throughout the duct. A partial filling defect attached to the anterior wall is seen in proximal common bile duct raising possibility of sloughed mucosa.  2. There is moderate gallbladder distension with mild edematous thickening of the bladder walls measuring 5 mm and thin pericholecystic fluid. There is heterogenous signal within the gall bladder raising possibility of sludge.No gallstones are identified. These findings are concerning for cholecystitis. Correlate with clinical and laboratory findings as regards further evaluation and follow-up.  3. Details and other findings as discussed above.                          Preliminary result by Jaquan Donis Jr., MD (11/26/22 06:55:56)                   Narrative:    START OF REPORT:  Technique: Magnetic resonance imaging of the abdomen without intravenous contrast.    Comparison: Comparison is with study dated â2022-11-25 15:15:43â.    Clinical History: RUQ pain, cholelithiasis.    Findings:  Pleura: There are small bilateral pleural effusions.  Heart: Mild cardiomegaly is seen.  Abdomen:  Abdominal Wall: No abdominal wall pathology is seen.  Liver: The liver appears unremarkable except for a tiny T2 hyperintense cyst in segment 4. No intrahepatic bile duct dilatation is seen in the liver.  Biliary System: Mild extrahepatic biliary duct dilatation is seen. The CBD measures 13 mm in maximum diameter with sludge like dependent densities throughout the duct. A partial filling defect attached to the anterior wall is seen in proximal common bile duct raising possibility of sloughed mucosa.  Gallbladder: There is moderate gallbladder distension with mild edematous thickening of the bladder walls measuring 5 mm and thin pericholecystic fluid. There is heterogenous signal within the gall bladder raising possibility of sludge.No gallstones are identified. These findings are  concerning for cholecystitis.  Pancreas: The pancreas appears unremarkable.  Spleen: The spleen appears unremarkable.  Adrenals: The adrenal glands appear unremarkable.  Kidneys: The right kidney is not visualised as in prior CT. There is extensive left perinephric fat stranding with fluid in perinephric and posterior pararenal fascia .  Peritoneum: There is minimal free fluid in perihepatic and bilateral paracolic gutters.      Impression:  1. Mild extrahepatic biliary duct dilatation is seen. The CBD measures 13 mm in maximum diameter with sludge like dependent densities throughout the duct. A partial filling defect attached to the anterior wall is seen in proximal common bile duct raising possibility of sloughed mucosa.  2. There is moderate gallbladder distension with mild edematous thickening of the bladder walls measuring 5 mm and thin pericholecystic fluid. There is heterogenous signal within the gall bladder raising possibility of sludge.No gallstones are identified. These findings are concerning for cholecystitis. Correlate with clinical and laboratory findings as regards further evaluation and follow-up.  3. Details and other findings as discussed above.                                         Medications   piperacillin-tazobactam (ZOSYN) 4.5 g in dextrose 5 % in water (D5W) 5 % 100 mL IVPB (MB+) (0 g Intravenous Stopped 11/26/22 2139)   morphine injection 4 mg (4 mg Intravenous Given 11/26/22 2108)   lactated ringers infusion ( Intravenous New Bag 11/26/22 0615)   sodium chloride 0.9% flush 10 mL (has no administration in time range)   ondansetron injection 4 mg (has no administration in time range)   acetaminophen tablet 650 mg (has no administration in time range)   acetaminophen tablet 650 mg (has no administration in time range)   glucose chewable tablet 16 g (has no administration in time range)   glucose chewable tablet 24 g (has no administration in time range)   dextrose 10% bolus 125 mL (has no  administration in time range)   dextrose 10% bolus 250 mL (has no administration in time range)   glucagon (human recombinant) injection 1 mg (has no administration in time range)   hydrALAZINE injection 10 mg (has no administration in time range)   polyethylene glycol packet 17 g (has no administration in time range)   atorvastatin tablet 10 mg (has no administration in time range)   morphine 4 mg/mL injection (  Given 11/26/22 0315)     Medical Decision Making:   History:   Old Medical Records: I decided to obtain old medical records.  Old Records Summarized: records from another hospital.  Initial Assessment:   Transfer for cholecystitis  Differential Diagnosis:   Cholecystitis, choledocholithiasis, gbmass  Clinical Tests:   Lab Tests: Reviewed  Radiological Study: Reviewed  ED Management:  Surgery consult, hospitalist admission, continue abx, pain control  Other:   I have discussed this case with another health care provider.        Scribe Attestation:   Scribe #1: I performed the above scribed service and the documentation accurately describes the services I performed. I attest to the accuracy of the note.  Comments: Attending:   Physician Attestation Statement for Scribe #1: I, Wanda Lindo MD, personally performed the services described in this documentation. All medical record entries made by the scribe were at my direction and in my presence.  I have reviewed the chart and agree that the record reflects my personal performance and is accurate and complete.          ED Course as of 11/27/22 0016 Fri Nov 25, 2022 2327  consulted [BS]   2333 Surgery aware lipase normal at this time, still requesting hospitalist admit as he will need MRCP and GI evaluation with recent hospitalization and gb mass. Hospitalist consulted [BS]      ED Course User Index  [BS] Wanda Lindo MD                 Clinical Impression:   Final diagnoses:  [K81.0] Acute cholecystitis      ED Disposition Condition    Admit  Stable                Wanda Lindo MD  11/27/22 0016

## 2022-11-26 NOTE — CONSULTS
GI Consult Note    Reason for Consult:      We were consulted by Dr. Pineda to evaluate this patient for acute cholecystitis and possible gb mass.     HPI:   77 year old male with hx of HTN.     Presented to [facility] ED on 11/22/22 with reports of abdominal pain mostly in epigastric area.     Labs showed wbc 17.5,  , . Tbili and ALP wnl. Cr 1.38. Glucose 221.  Lipase was >3000.     Ct on 11/22/22 with contrast showed Acute inflammatory change within the peripancreatic fat with fat stranding.  No pancreatic ductal dilatation.There is edema at the 2nd and 3rd portion of the duodenum. GB without stones. CBD 7-8mm. Hepatic steatosis.     It also noted Borderline common bile duct dilatation. Left lower lobe pulmonary nodule. For a solid nodule 6-8 mm, Fleischner Society 2017 guidelines recommend follow up with non-contrast chest CT at 6-12 months and 18-24 months after discovery. Prostatomegaly with bladder distension.  Correlate for urinary retention    Seems he left AMA.     Presented back to the ED yesterday due to continued abdominal pain. Labs showed lipase 41, wbc 19.5, Cr wnl, ALT 66, and all other LFTs wnl. Repeat CT consistent with pancreatitis overall unchanged since prior examination with no evidence of necrotizing pancreatitis seen at this time. There are some concomitant inflammatory changes seen in the duodenum adjacent to the pancreatic head. Small amount of perihepatic fluid seen which is a new finding.  Findings are most likely reactive. No biliary dilation/ cbd dilation this time. US done Abnormal appearing gallbladder.  There are some gallstones in the gallbladder and a questionable mass in the gallbladder.  Surgical consultation is recommended.The liver is echogenic in appearance.  It is enlarged.  It measures 18.7 cm.  Findings are consistent with hepatic steatosis.  No reports of CBD size.     MRCP done today with reports of Mild nonspecific gallbladder wall edema and trace  pericholecystic fluid with no gallstones seen. Mild extrahepatic biliary ductal dilatation.  A filling defect within the proximal common bile duct is favored artifactual.  No distal common bile duct filling defects seen. Mild acute interstitial pancreatitis.    CA 19-9 66.41.     Denies prior hx of pancreatitis. Denies hx of etoh abuse. Denies any new medications, herbal med use, or elicit drug use.     Previous records reviewed.    PCP:  Bryant Rhodes MD    Review of patient's allergies indicates:   Allergen Reactions    Aspirin      asthma        Current Facility-Administered Medications   Medication Dose Route Frequency Provider Last Rate Last Admin    acetaminophen tablet 650 mg  650 mg Oral Q8H PRN AMADO Vincent-FATOU        acetaminophen tablet 650 mg  650 mg Oral Q4H PRN Lizzie Pineda PA-C        amLODIPine tablet 5 mg  5 mg Oral Daily Linda Hatfield MD        dextrose 10% bolus 125 mL  12.5 g Intravenous PRN Lizzie Pineda PA-C        dextrose 10% bolus 250 mL  25 g Intravenous PRN Lizzie Pineda PA-C        glucagon (human recombinant) injection 1 mg  1 mg Intramuscular PRN Lizzie Pineda PA-C        glucose chewable tablet 16 g  16 g Oral PRN AMADO Vincent-FATOU        glucose chewable tablet 24 g  24 g Oral PRN Lizzie Pineda PA-C        hydrALAZINE injection 10 mg  10 mg Intravenous Q2H PRN Lizzie Pineda PA-C        lactated ringers infusion   Intravenous Continuous eJrry Pineda  mL/hr at 11/26/22 0615 New Bag at 11/26/22 0615    morphine injection 4 mg  4 mg Intravenous Q4H PRN Jerry Pineda MD        ondansetron injection 4 mg  4 mg Intravenous Q4H PRN Lizzie Pineda PA-C        piperacillin-tazobactam (ZOSYN) 4.5 g in dextrose 5 % in water (D5W) 5 % 100 mL IVPB (MB+)  4.5 g Intravenous Q8H Wanda Lindo MD   Stopped at 11/26/22 0505    polyethylene glycol packet 17 g  17 g Oral Daily PRN Linda Hatfield MD        sodium chloride 0.9% flush 10  mL  10 mL Intravenous Q12H PRN Lizzie Pineda PA-C         Medications Prior to Admission   Medication Sig Dispense Refill Last Dose    amLODIPine (NORVASC) 5 MG tablet Take 5 mg by mouth once daily.       HYDROcodone-acetaminophen (NORCO)  mg per tablet Take 1 tablet by mouth every 6 (six) hours as needed for Pain. 15 tablet 0     ondansetron (ZOFRAN) 4 MG tablet Take 1 tablet (4 mg total) by mouth every 6 (six) hours. 12 tablet 0     simvastatin (ZOCOR) 40 MG tablet Take 40 mg by mouth once daily.       tamsulosin (FLOMAX) 0.4 mg Cap Take 1 capsule by mouth once daily.          Past Medical History:  No past medical history on file.   Past Surgical History:  No past surgical history on file.   Family History:  No family history on file.  Social History:  Social History     Tobacco Use    Smoking status: Not on file    Smokeless tobacco: Not on file   Substance Use Topics    Alcohol use: Not on file           Review of Systems:     Review of Systems   Constitutional:  Negative for appetite change.   HENT:  Negative for trouble swallowing.    Eyes:  Negative for visual disturbance.   Respiratory:  Negative for cough, choking and shortness of breath.    Cardiovascular:  Negative for chest pain, palpitations and leg swelling.   Gastrointestinal:  Positive for abdominal pain and nausea.   Genitourinary:  Negative for flank pain, frequency and urgency.   Musculoskeletal:  Negative for gait problem and joint swelling.   Skin:  Negative for pallor.   Neurological:  Negative for dizziness and syncope.   Psychiatric/Behavioral:  Negative for confusion and hallucinations.      Objective:       VITAL SIGNS: 24 HR MIN & MAX LAST    Temp  Min: 97.4 °F (36.3 °C)  Max: 98.7 °F (37.1 °C)  98.4 °F (36.9 °C)        BP  Min: 168/93  Max: 199/94  (!) 181/82     Pulse  Min: 67  Max: 85  72     Resp  Min: 14  Max: 25  20    SpO2  Min: 92 %  Max: 99 %  (!) 92 %        Intake/Output Summary (Last 24 hours) at 11/26/2022  1217  Last data filed at 11/26/2022 0505  Gross per 24 hour   Intake 100 ml   Output --   Net 100 ml       Physical Exam  Constitutional:       Appearance: He is not ill-appearing.   HENT:      Head: Normocephalic.   Eyes:      Extraocular Movements: Extraocular movements intact.   Cardiovascular:      Rate and Rhythm: Normal rate.   Pulmonary:      Effort: No respiratory distress.   Abdominal:      Tenderness: There is no abdominal tenderness. There is no guarding.   Skin:     Coloration: Skin is not jaundiced.   Neurological:      General: No focal deficit present.      Mental Status: He is alert.   Psychiatric:         Mood and Affect: Mood normal.         Recent Results (from the past 48 hour(s))   Comprehensive metabolic panel    Collection Time: 11/25/22  1:29 PM   Result Value Ref Range    Sodium Level 139 136 - 145 mmol/L    Potassium Level 4.2 3.5 - 5.1 mmol/L    Chloride 102 98 - 107 mmol/L    Carbon Dioxide 27 23 - 31 mmol/L    Glucose Level 165 (H) 82 - 115 mg/dL    Blood Urea Nitrogen 19.3 8.4 - 25.7 mg/dL    Creatinine 1.02 0.73 - 1.18 mg/dL    Calcium Level Total 9.2 8.8 - 10.0 mg/dL    Protein Total 6.9 5.8 - 7.6 gm/dL    Albumin Level 3.0 (L) 3.4 - 4.8 gm/dL    Globulin 3.9 (H) 2.4 - 3.5 gm/dL    Albumin/Globulin Ratio 0.8 (L) 1.1 - 2.0 ratio    Bilirubin Total 1.5 <=1.5 mg/dL    Alkaline Phosphatase 85 40 - 150 unit/L    Alanine Aminotransferase 66 (H) 0 - 55 unit/L    Aspartate Aminotransferase 27 5 - 34 unit/L    eGFR >60 mls/min/1.73/m2   Lipase    Collection Time: 11/25/22  1:29 PM   Result Value Ref Range    Lipase Level 41 <=60 U/L   CBC with Differential    Collection Time: 11/25/22  1:29 PM   Result Value Ref Range    WBC 19.5 (H) 4.5 - 11.5 x10(3)/mcL    RBC 4.79 4.70 - 6.10 x10(6)/mcL    Hgb 14.3 14.0 - 18.0 gm/dL    Hct 44.6 42.0 - 52.0 %    MCV 93.1 80.0 - 94.0 fL    MCH 29.9 27.0 - 31.0 pg    MCHC 32.1 (L) 33.0 - 36.0 mg/dL    RDW 14.3 11.5 - 17.0 %    Platelet 328 130 - 400 x10(3)/mcL     MPV 11.7 (H) 7.4 - 10.4 fL    Neut % 84.2 %    Lymph % 6.4 %    Mono % 8.7 %    Eos % 0.1 %    Basophil % 0.1 %    Lymph # 1.25 0.6 - 4.6 x10(3)/mcL    Neut # 16.4 (H) 2.1 - 9.2 x10(3)/mcL    Mono # 1.70 (H) 0.1 - 1.3 x10(3)/mcL    Eos # 0.02 0 - 0.9 x10(3)/mcL    Baso # 0.02 0 - 0.2 x10(3)/mcL    IG# 0.09 (H) 0 - 0.04 x10(3)/mcL    IG% 0.5 %   Lactic acid, plasma    Collection Time: 11/25/22  1:50 PM   Result Value Ref Range    Lactic Acid Level 1.2 0.5 - 2.2 mmol/L   Cancer Antigen 19-9    Collection Time: 11/26/22  8:40 AM   Result Value Ref Range    CA 19-9 66.41 (H) 0.00 - 37.00 unit/mL       CT Abdomen Pelvis With Contrast    Result Date: 11/25/2022  EXAMINATION: CT ABDOMEN PELVIS WITH CONTRAST CLINICAL HISTORY: Abdominal pain, acute, nonlocalized; TECHNIQUE: Low dose axial images, sagittal and coronal reformations were obtained from the lung bases to the pubic symphysis following the IV administration of contrast. Automatic exposure control (AEC) is utilized to reduce patient radiation exposure. COMPARISON: 11/23/2022 FINDINGS: There is bibasilar atelectasis..  There is a punctate pulmonary nodule seen in the left lower lobe which was described on the prior CT scan. There is a small amount of perihepatic fluid seen.  There is evidence of hepatic steatosis.  No liver mass or lesion is seen.  Portal and hepatic veins appear normal. The gallbladder appears normal.  No obvious gallstones are seen.  No biliary dilatation is seen.  No pericholecystic fluid is seen. There are some inflammatory changes seen around the pancreas consistent with pancreatitis.  The pancreas enhances diffusely.  No evidence of necrotizing pancreatitis seen.  No evidence of a pseudocyst is seen at this time.  Inflammatory changes appears similar to the prior examination.  There are some concomitant inflammatory changes seen in the duodenum adjacent to the pancreatic head.. The spleen shows no acute abnormality. The adrenal glands appear  normal.  No adrenal nodule is seen. The right kidney is surgically absent.  The left kidney appears normal in size.  There is a cyst in the left kidney in the midpole..  No hydronephrosis is seen.  No hydroureter is seen.  No nephrolithiasis is seen.  No obvious ureteral stones are seen. Urinary bladder appears grossly unremarkable. The prostate is mildly enlarged in size.. No colitis is seen.  No diverticulitis is seen.  No obvious colonic mass or lesion is seen. No free air is seen.  No free fluid is seen.     Findings seen consistent with pancreatitis overall unchanged since prior examination with no evidence of necrotizing pancreatitis seen at this time Hepatic steatosis Small amount of perihepatic fluid seen which is a new finding.  Findings are most likely reactive Bibasilar atelectasis Left lower lobe punctate nodule for which 6-12 month follow-up is recommended Electronically signed by: Francine Ken Date:    11/25/2022 Time:    15:25    CT Abdomen Pelvis With Contrast    Result Date: 11/23/2022  EXAMINATION: CT ABDOMEN PELVIS WITH CONTRAST CLINICAL HISTORY: Abdominal pain, acute, nonlocalized; TECHNIQUE: Helically acquired images with axial, sagittal and coronal reformations were obtained from the lung bases to the pubic symphysis after the IV administration of contrast. Automated tube current modulation, weight-based exposure dosing, and/or iterative reconstruction technique utilized to reach lowest reasonably achievable exposure rate. DLP: 1318 mGy*cm COMPARISON: No relevant prior available for comparison at the time of dictation. FINDINGS: HEART: Normal in size. No pericardial effusion. LUNG BASES: There is a 7 mm nodule in the left lower lobe (3, 17). LIVER: The liver appears hypodense, nonspecific on contrast enhanced exam but possibly related to steatosis. BILIARY: No calcified gallstones.  Common bile duct measures 7-8 mm in diameter. PANCREAS: Acute inflammatory change within the peripancreatic  fat with fat stranding.  No pancreatic ductal dilatation. SPLEEN: Normal in size ADRENALS: No mass. KIDNEYS/URETERS: The right kidney is surgically absent.  Left kidney enhances normally.  No hydronephrosis.  Incidental cyst at the lower pole left kidney.   No follow-up imaging is recommended as incidental lesions are likely benign. GI TRACT/MESENTERY:  There is edema at the 2nd and 3rd portion of the duodenum.   The appendix is normal. PERITONEUM: No free fluid.No free air. LYMPH NODES: No enlarged lymph nodes by size criteria. VASCULATURE: Aortoiliac atherosclerosis. BLADDER: Distended above the pelvic brim. REPRODUCTIVE ORGANS: Prostatomegaly. SOFT TISSUES: Unremarkable. BONES: Degenerative changes are present at the lumbar spine.     1. Changes of mild to moderate acute interstitial edematous pancreatitis and duodenitis 2. Borderline common bile duct dilatation 3. Left lower lobe pulmonary nodule. For a solid nodule 6-8 mm, Fleischner Society 2017 guidelines recommend follow up with non-contrast chest CT at 6-12 months and 18-24 months after discovery. 4. Prostatomegaly with bladder distension.  Correlate for urinary retention No significant discrepancy from preliminary report. Electronically signed by: Kelsy Kelley Date:    11/23/2022 Time:    07:25    X-Ray Chest AP Portable    Result Date: 11/23/2022  EXAMINATION: XR CHEST AP PORTABLE CLINICAL HISTORY: chest pain; TECHNIQUE: Single view of the chest COMPARISON: No prior imaging available for comparison. FINDINGS: No focal opacification, pleural effusion, or pneumothorax. The cardiomediastinal silhouette is within normal limits. No acute osseous abnormality.     No acute cardiopulmonary process. Electronically signed by: Paulo Escamilla Date:    11/23/2022 Time:    06:23    MRI MRCP Without Contrast    Result Date: 11/26/2022  EXAMINATION: MRI ABDOMEN WITHOUT CONTRAST MRCP CLINICAL HISTORY: r/o biliary obstx; TECHNIQUE: Multiplanar multisequence MRI of the  abdomen performed without gadolinium based IV contrast according to MRCP protocol. COMPARISON: CT yesterday FINDINGS: Liver: The liver is mildly enlarged and there is hepatic steatosis.  Suspect a miniscule cyst of the right hepatic lobe on image 21 series 5. Gallbladder and biliary tree: No convincing gallstones.  There is some gallbladder sludge.  Mild gallbladder wall edema.  Trace pericholecystic fluid.  The common bile duct is mildly dilated measuring up to 11 mm.  There is some gradual tapering towards the ampulla.  Filling defect proximal common bile duct only seen on some sequences.  It is not seen on the T2 sequences.  This may be artifactual.  Otherwise no suspicious common bile duct filling defect. Spleen: Normal. Pancreas: Minimal dilatation of the main pancreatic duct throughout.  Mild peripancreatic stranding.  No organized peripancreatic collection. Adrenals: Normal. Kidneys: Right kidney absent.  No hydronephrosis left kidney.  There is perinephric stranding. GI tract: No obstruction.  Similar stranding along the root of the mesentery. Retroperitoneum: No abdominal aortic aneurysm.  No retroperitoneal adenopathy. Trace ascites.     1. Mild nonspecific gallbladder wall edema and trace pericholecystic fluid with no gallstones seen. 2. Mild extrahepatic biliary ductal dilatation.  A filling defect within the proximal common bile duct is favored artifactual.  No distal common bile duct filling defects seen. 3. Mild acute interstitial pancreatitis. 4. Trace ascites. Electronically signed by: Prabhakar Nichols Date:    11/26/2022 Time:    10:41    US Abdomen Limited_Gallbladder    Result Date: 11/25/2022  EXAMINATION: US ABDOMEN LIMITED_GALLBLADDER CLINICAL HISTORY: RUQ abdominal Pain; TECHNIQUE: Complete abdominal ultrasound (including pancreas, aorta, liver, gallbladder, common bile duct, IVC, kidneys, and spleen) was performed. COMPARISON: None FINDINGS: The pancreas is not seen due to overlying bowel gas.   The proximal aorta measures 2.1 cm.  Mid and distal aorta are not well seen but the distal aorta measures roughly 1.1 cm.  IVC is patent.  The liver is echogenic in appearance.  It is enlarged.  It measures 18.7 cm.  Findings are consistent with hepatic steatosis.  No liver mass or lesion is seen.  Portal and hepatic veins appear normal.  There are multiple gallstones seen in the gallbladder.  There is a questionable gallbladder wall mass seen on image number 45 of 63.  It is relatively avascular.  There is mixed echogenicity seen in the area and it measures 3.5 x 4.5 x 2.7 cm.  No pericholecystic fluid seen. The right kidney has been surgically removed.     Abnormal appearing gallbladder.  There are some gallstones in the gallbladder and a questionable mass in the gallbladder.  Surgical consultation is recommended. Hepatomegaly with findings consistent with hepatic steatosis Pancreas is not visualized due to overlying bowel gas Electronically signed by: Francine Ken Date:    11/25/2022 Time:    16:37      [unfilled]    Assessment / Plan:       Assessment & Plan:   77 year old male with hx of HTN. Recently went to [facility] ED on 11/22/22 due to abdominal pain where he was found to have acute pancreatitis, with , . along with leukocytosis. Other LFTs wnl. CT scan confirmed pancreatitis, noted cbd at 7-8mm. Gb without reports of stones, hepatic steatosis. He left AMA.     Presented back here yesterday due to continued abdominal pain. Gi consulted due to reports of pancreatitis and cholelithiasis.       Acute pancreatitis--- likely biliary?    Elevated LFTs--- improving     3. Leukocytosis- secondary to number 1.     - , . Now ALT 66, and all other LFTs wnl.   - Lipase was >3000 on 22 now wnl   - On Zosyn     - Repeat CT  with contrast yesterday consistent with pancreatitis overall unchanged since prior examination with no evidence of necrotizing pancreatitis seen at this time. There are  some concomitant inflammatory changes seen in the duodenum adjacent to the pancreatic head. Small amount of perihepatic fluid seen which is a new finding.  No biliary dilation/ cbd dilation this time.   -US yesterday Abnormal appearing gallbladder.  There are some gallstones in the gallbladder and a questionable mass in the gallbladder. Hepatic steatosis.  No reports of CBD size.   -MRCP done today with reports of Mild nonspecific gallbladder wall edema and trace pericholecystic fluid with no gallstones seen. No reports of a mass. Mild extrahepatic biliary ductal dilatation.  A filling defect within the proximal common bile duct is favored artifactual.  No distal common bile duct filling defects seen. Mild acute interstitial pancreatitis.  - CA 19-9 66.41.   - Cr wnl   - lactated ringers 125cc/hr and morphine 4mg ever four hours PRN    Will discuss things further with Dr. Valdez.   Sx on case will follow up their recs

## 2022-11-26 NOTE — ED NOTES
Pt transferred to [facility] via stretcher per [facility]. Resp even and unlabored. No acute distress noted.

## 2022-11-26 NOTE — H&P
[facility] Medicine History & Physical Examination       Patient Name: Blake Neville  MRN: [id number]  Patient Class: IP- Inpatient   Admission Date: 11/25/2022   Admitting Physician: Jerry Pineda MD   Length of Stay: 0  Attending Physician: Linda Hatfield MD  Primary Care Provider: Bryant Rhodes MD  Face-to-Face encounter date: 11/26/2022  Code Status: Full Code  Chief Complaint: Abdominal Pain ([facility] Tx from [facility] for surgical services regarding acute cholecystitis w/ mass on gallbladder. Denies abd pain currently. )        Patient information was obtained from patient, patient's family, past medical records and ER records.     HISTORY OF PRESENT ILLNESS:   Blake Neville is a 77 y.o. White male with a past medical history of hypertension, hyperlipidemia, BPH and kidney cancer status post right nephrectomy three and a half years ago. The patient presented to [facility] on 11/25/2022 as a transfer from [facility] ED with acute cholecystitis needing General surgery services.  Patient was initially seen in the ED on 11/22/2022 with complaints of upper abdominal pain and vomiting and diagnosed with pancreatitis.  He was sent home with pain medication. Patient returned to outside facility ED yesterday (11/25/2022) with complaints right-sided abdominal pain.  Patient reports abdominal pain mostly located to lower abdomen with radiation to the right flank.  He describes pain as intermittent, worsening with movement or coughing and sharp in nature.  Wife at bedside reports patient has been mostly taking in water since Tuesday due to vomiting.  Associated symptoms include low-grade fever of 99° F, burning with urination and sore throat with cough.  He denies complaints of shortness of breath, chest pain, diarrhea and prior pancreatitis.  His last bowel movement was on 11/21/2022 and normal.  He reports drinking alcohol once every 2 weeks.    Upon presentation to the ED, patient  afebrile, hypertensive 174/88 and SpO2 95% on room air.  Labs revealed WBC 19.5, glucose 165, AST 66.  CA 19-9 elevated at 66.41.  CT abdomen and pelvis revealed findings consistent with pancreatitis unchanged since prior exam without evidence of necrotizing pancreatitis, hepatic steatosis, small amount of perihepatic fluid which is a new finding and most likely reactive, bibasilar atelectasis and left lower lobe punctate nodule with recommended follow-up in 6-12 months.  Right upper quadrant abdominal ultrasound revealed an abnormal appearing gallbladder, some gallstones in the gallbladder with a questionable mass in the gallbladder, hepatomegaly with findings consistent with hepatic steatosis and pancreas not visualized due to overlying bowel gas.  At outside facility patient was started on Zosyn and received Zofran and morphine.  Patient was transferred to [facility]. General surgery consulted and recommended GI consult.  Patient admitted to hospital medicine services and further medical management.    PAST MEDICAL HISTORY:   Hypertension  Hyperlipidemia   BPH  Kidney cancer status post right nephrectomy    PAST SURGICAL HISTORY:   Right nephrectomy   Umbilical hernia repair   Wrist surgery    ALLERGIES:   Aspirin    FAMILY HISTORY:   Reviewed and negative    SOCIAL HISTORY:   Tobacco - Former smoker; quit 40 years ago  Alcohol - Every 2 weeks  Illicit Drugs - Denies    HOME MEDICATIONS:     Prior to Admission medications    Medication Sig Start Date End Date Taking? Authorizing Provider   amLODIPine (NORVASC) 5 MG tablet Take 5 mg by mouth once daily. 11/21/22  Yes Historical Provider   HYDROcodone-acetaminophen (NORCO)  mg per tablet Take 1 tablet by mouth every 6 (six) hours as needed for Pain. 11/23/22  Yes Payam Eid MD   ondansetron (ZOFRAN) 4 MG tablet Take 1 tablet (4 mg total) by mouth every 6 (six) hours. 11/23/22  Yes Payam Eid MD   simvastatin (ZOCOR) 40 MG tablet Take 40 mg by mouth once  daily. 9/23/22  Yes Historical Provider   tamsulosin (FLOMAX) 0.4 mg Cap Take 1 capsule by mouth once daily. 7/22/22  Yes Historical Provider       REVIEW OF SYSTEMS:   Except as documented, all other systems reviewed and negative     PHYSICAL EXAM:     VITAL SIGNS: 24 HRS MIN & MAX LAST   Temp  Min: 97.4 °F (36.3 °C)  Max: 98.7 °F (37.1 °C) 97.4 °F (36.3 °C)   BP  Min: 168/93  Max: 199/94 (!) 169/81     Pulse  Min: 67  Max: 85  73   Resp  Min: 14  Max: 25 (!) 21     SpO2  Min: 92 %  Max: 99 % (!) 93 %         General appearance: Well-developed, well-nourished male in no apparent distress. Wife at bedside.  HEENT: Atraumatic head. Moist mucous membranes of oral cavity.  Lungs: Clear to auscultation bilaterally.   Heart: Regular rate and rhythm.   Abdomen: Soft, non-distended, right upper quadrant and epigastric region. Bowel sounds are normal.   Extremities: No cyanosis, clubbing. No deformities.  Skin: No Rash. Warm and dry.  Neuro: Awake, alert and oriented. Motor and sensory exams grossly intact.  Psych/mental status: Appropriate mood and affect. Cooperative. Responds appropriately to questions.       LABS AND IMAGING:     Recent Labs   Lab 11/22/22 2310 11/25/22  1329   WBC 17.5* 19.5*   RBC 4.91 4.79   HGB 14.8 14.3   HCT 44.8 44.6   MCV 91.2 93.1   MCH 30.1 29.9   MCHC 33.0 32.1*   RDW 13.5 14.3    328   MPV 10.0 11.7*       Recent Labs   Lab 11/22/22 2310 11/25/22  1329    139   K 3.9 4.2   CO2 24 27   BUN 20.0 19.3   CREATININE 1.38* 1.02   CALCIUM 9.5 9.2   ALBUMIN 3.9 3.0*   ALKPHOS 110 85   * 66*   * 27   BILITOT 1.5 1.5       Microbiology Results (last 7 days)       ** No results found for the last 168 hours. **             MRI MRCP Without Contrast  START OF REPORT:  Technique: Magnetic resonance imaging of the abdomen without intravenous contrast.    Comparison: Comparison is with study dated â2022-11-25 15:15:43â.    Clinical History: RUQ pain,  cholelithiasis.    Findings:  Pleura: There are small bilateral pleural effusions.  Heart: Mild cardiomegaly is seen.  Abdomen:  Abdominal Wall: No abdominal wall pathology is seen.  Liver: The liver appears unremarkable except for a tiny T2 hyperintense cyst in segment 4. No intrahepatic bile duct dilatation is seen in the liver.  Biliary System: Mild extrahepatic biliary duct dilatation is seen. The CBD measures 13 mm in maximum diameter with sludge like dependent densities throughout the duct. A partial filling defect attached to the anterior wall is seen in proximal common bile duct raising possibility of sloughed mucosa.  Gallbladder: There is moderate gallbladder distension with mild edematous thickening of the bladder walls measuring 5 mm and thin pericholecystic fluid. There is heterogenous signal within the gall bladder raising possibility of sludge.No gallstones are identified. These findings are concerning for cholecystitis.  Pancreas: The pancreas appears unremarkable.  Spleen: The spleen appears unremarkable.  Adrenals: The adrenal glands appear unremarkable.  Kidneys: The right kidney is not visualised as in prior CT. There is extensive left perinephric fat stranding with fluid in perinephric and posterior pararenal fascia .  Peritoneum: There is minimal free fluid in perihepatic and bilateral paracolic gutters.    Impression:  1. Mild extrahepatic biliary duct dilatation is seen. The CBD measures 13 mm in maximum diameter with sludge like dependent densities throughout the duct. A partial filling defect attached to the anterior wall is seen in proximal common bile duct raising possibility of sloughed mucosa.  2. There is moderate gallbladder distension with mild edematous thickening of the bladder walls measuring 5 mm and thin pericholecystic fluid. There is heterogenous signal within the gall bladder raising possibility of sludge.No gallstones are identified. These findings are concerning for  cholecystitis. Correlate with clinical and laboratory findings as regards further evaluation and follow-up.  3. Details and other findings as discussed above.        ASSESSMENT & PLAN:   Assessment:  Acute cholecystitis versus gallbladder mass  Acute pancreatitis  Elevated CA 19-9  Essential hypertension   Hyperlipidemia    Plan:  - General surgery consulted and recommended GI consult   - MRCP ordered  - NPO for bowel rest.  IV fluid hydration with lactated Ringer's at 125 mL/hr  - Continue with Zosyn  - Follow results of blood cultures  - IV morphine as needed for pain  - IV Zofran as needed for nausea  - Resume appropriate home medication   - Labs in AM      VTE Prophylaxis: will be placed on SCD for DVT prophylaxis and will be advised to be as mobile as possible and sit in a chair as tolerated      __________________________________________________________________________  INPATIENT LIST OF MEDICATIONS     Current Facility-Administered Medications:     acetaminophen tablet 650 mg, 650 mg, Oral, Q8H PRN, Lizzie Pineda PA-C    acetaminophen tablet 650 mg, 650 mg, Oral, Q4H PRN, Lizzie Pineda PA-C    dextrose 10% bolus 125 mL, 12.5 g, Intravenous, PRN, AMADO Vincent-FATOU    dextrose 10% bolus 250 mL, 25 g, Intravenous, PRN, AMADO Vincent-FATOU    glucagon (human recombinant) injection 1 mg, 1 mg, Intramuscular, PRN, Lizzie Pineda PA-C    glucose chewable tablet 16 g, 16 g, Oral, PRN, AMADO Vincent-FATOU    glucose chewable tablet 24 g, 24 g, Oral, PRN, Lizzie Pineda PA-C    lactated ringers infusion, , Intravenous, Continuous, Jerry Pineda MD, Last Rate: 125 mL/hr at 11/26/22 0615, New Bag at 11/26/22 0615    morphine injection 4 mg, 4 mg, Intravenous, Q4H PRN, Jerry Pineda MD    ondansetron injection 4 mg, 4 mg, Intravenous, Q4H PRN, Lizzie Pineda, PAColtonC    piperacillin-tazobactam (ZOSYN) 4.5 g in dextrose 5 % in water (D5W) 5 % 100 mL IVPB (MB+), 4.5 g, Intravenous,  Q8H, Wanda Lindo MD, Stopped at 11/26/22 0505    sodium chloride 0.9% flush 10 mL, 10 mL, Intravenous, Q12H PRN, Lizzie Pineda PA-C      Scheduled Meds:   piperacillin-tazobactam (ZOSYN) IVPB  4.5 g Intravenous Q8H     Continuous Infusions:   lactated ringers 125 mL/hr at 11/26/22 0615     PRN Meds:.acetaminophen, acetaminophen, dextrose 10%, dextrose 10%, glucagon (human recombinant), glucose, glucose, morphine, ondansetron, sodium chloride 0.9%      Discharge Planning and Disposition: Anticipated discharge to be determined.    ILizzie PA, have reviewed and discussed the case with Dr. MARIA DEL ROSARIO Hatfield.    Please see the following addendum for further assessment and plan from there attending MD.    Lizzie Pineda PA-C  11/26/2022    ________________________________________________________________________________    MD Addendum:  I, Dr. MARIA DEL ROSARIO Hatifeld, assumed care of this patient today at 10am  For the patient encounter, I performed the substantive portion of the visit, I reviewed the NP/PA documentation, treatment plan, and medical decision making.  I had face to face time with this patient     A. History:Blake Neville is a 77 y.o. male who presents to the emergency department with cholelithiasis and leukocytosis following recent pancreatitis episode early this week. US findings also showed concern for a possible gallbladder mass at OSH.     B. Physical exam:  Alert,Orientedx3,Lungs-normal,Heart-RRR,Abdomen-left upper quadrant tenderness on deep palpation,Prieto-negative, Neuro:No focal neuro deficits    C. Medical decision making:  Acute pancreatitis- 2/2 Gallstones  Acute cholecystitis versus gallbladder mass  HTN  HLD    Plan:  Surgery on board, MRCP to evaluate the mass before proceeding with any Cholecystectomy. GI to perform MRCP.  -Pain control.  -Bowel regimen.  -Anti emetics.  -IV fluids 125cc/hr.  -cont Zosyn.  -NPO  -f/u fever curve,WBC, LFTs,lipase,CRP  -Pt does not report taking any home  meds for HTN, takes a statin        All diagnosis and differential diagnosis have been reviewed; assessment and plan has been documented; I have personally reviewed the labs and test results that are presently available; I have reviewed the patients medication list; I have reviewed the consulting providers response and recommendations. I have reviewed or attempted to review medical records based upon their availability.    All of the patient and family questions have been addressed and answered. Patient's is agreeable to the above stated plan. I will continue to monitor closely and make adjustments to medical management as needed.      Linda Hatfield MD  11/26/2022

## 2022-11-27 LAB
ALBUMIN SERPL-MCNC: 2.5 GM/DL (ref 3.4–4.8)
ALBUMIN/GLOB SERPL: 0.8 RATIO (ref 1.1–2)
ALP SERPL-CCNC: 280 UNIT/L (ref 40–150)
ALT SERPL-CCNC: 292 UNIT/L (ref 0–55)
AST SERPL-CCNC: 230 UNIT/L (ref 5–34)
BASOPHILS # BLD AUTO: 0.05 X10(3)/MCL (ref 0–0.2)
BASOPHILS NFR BLD AUTO: 0.3 %
BILIRUBIN DIRECT+TOT PNL SERPL-MCNC: 3.6 MG/DL
BUN SERPL-MCNC: 17.5 MG/DL (ref 8.4–25.7)
CALCIUM SERPL-MCNC: 9 MG/DL (ref 8.8–10)
CHLORIDE SERPL-SCNC: 105 MMOL/L (ref 98–107)
CO2 SERPL-SCNC: 24 MMOL/L (ref 23–31)
CREAT SERPL-MCNC: 0.96 MG/DL (ref 0.73–1.18)
EOSINOPHIL # BLD AUTO: 0.04 X10(3)/MCL (ref 0–0.9)
EOSINOPHIL NFR BLD AUTO: 0.3 %
ERYTHROCYTE [DISTWIDTH] IN BLOOD BY AUTOMATED COUNT: 14.6 % (ref 11.5–17)
GFR SERPLBLD CREATININE-BSD FMLA CKD-EPI: >60 MLS/MIN/1.73/M2
GLOBULIN SER-MCNC: 3.3 GM/DL (ref 2.4–3.5)
GLUCOSE SERPL-MCNC: 130 MG/DL (ref 82–115)
HCT VFR BLD AUTO: 40.9 % (ref 42–52)
HGB BLD-MCNC: 13.6 GM/DL (ref 14–18)
IMM GRANULOCYTES # BLD AUTO: 0.17 X10(3)/MCL (ref 0–0.04)
IMM GRANULOCYTES NFR BLD AUTO: 1.1 %
LIPASE SERPL-CCNC: 73 U/L
LYMPHOCYTES # BLD AUTO: 1.33 X10(3)/MCL (ref 0.6–4.6)
LYMPHOCYTES NFR BLD AUTO: 8.3 %
MCH RBC QN AUTO: 30.4 PG (ref 27–31)
MCHC RBC AUTO-ENTMCNC: 33.3 MG/DL (ref 33–36)
MCV RBC AUTO: 91.5 FL (ref 80–94)
MONOCYTES # BLD AUTO: 1.66 X10(3)/MCL (ref 0.1–1.3)
MONOCYTES NFR BLD AUTO: 10.4 %
NEUTROPHILS # BLD AUTO: 12.7 X10(3)/MCL (ref 2.1–9.2)
NEUTROPHILS NFR BLD AUTO: 79.6 %
NRBC BLD AUTO-RTO: 0 %
PLATELET # BLD AUTO: 274 X10(3)/MCL (ref 130–400)
PMV BLD AUTO: 10.3 FL (ref 7.4–10.4)
POTASSIUM SERPL-SCNC: 3.5 MMOL/L (ref 3.5–5.1)
PROT SERPL-MCNC: 5.8 GM/DL (ref 5.8–7.6)
RBC # BLD AUTO: 4.47 X10(6)/MCL (ref 4.7–6.1)
SODIUM SERPL-SCNC: 139 MMOL/L (ref 136–145)
WBC # SPEC AUTO: 15.9 X10(3)/MCL (ref 4.5–11.5)

## 2022-11-27 PROCEDURE — 25000003 PHARM REV CODE 250: Performed by: EMERGENCY MEDICINE

## 2022-11-27 PROCEDURE — 36415 COLL VENOUS BLD VENIPUNCTURE: CPT | Performed by: PHYSICIAN ASSISTANT

## 2022-11-27 PROCEDURE — 85025 COMPLETE CBC W/AUTO DIFF WBC: CPT | Performed by: PHYSICIAN ASSISTANT

## 2022-11-27 PROCEDURE — 63600175 PHARM REV CODE 636 W HCPCS: Performed by: INTERNAL MEDICINE

## 2022-11-27 PROCEDURE — 63600175 PHARM REV CODE 636 W HCPCS: Performed by: PHYSICIAN ASSISTANT

## 2022-11-27 PROCEDURE — 25000003 PHARM REV CODE 250: Performed by: INTERNAL MEDICINE

## 2022-11-27 PROCEDURE — 63600175 PHARM REV CODE 636 W HCPCS: Performed by: EMERGENCY MEDICINE

## 2022-11-27 PROCEDURE — 80053 COMPREHEN METABOLIC PANEL: CPT | Performed by: PHYSICIAN ASSISTANT

## 2022-11-27 PROCEDURE — 83690 ASSAY OF LIPASE: CPT | Performed by: STUDENT IN AN ORGANIZED HEALTH CARE EDUCATION/TRAINING PROGRAM

## 2022-11-27 PROCEDURE — 21400001 HC TELEMETRY ROOM

## 2022-11-27 RX ORDER — AMLODIPINE BESYLATE 2.5 MG/1
2.5 TABLET ORAL ONCE
Status: DISCONTINUED | OUTPATIENT
Start: 2022-11-27 | End: 2022-11-30 | Stop reason: HOSPADM

## 2022-11-27 RX ORDER — HYDRALAZINE HYDROCHLORIDE 20 MG/ML
10 INJECTION INTRAMUSCULAR; INTRAVENOUS ONCE
Status: COMPLETED | OUTPATIENT
Start: 2022-11-27 | End: 2022-11-27

## 2022-11-27 RX ORDER — AMLODIPINE BESYLATE 5 MG/1
5 TABLET ORAL DAILY
Status: DISCONTINUED | OUTPATIENT
Start: 2022-11-28 | End: 2022-11-28

## 2022-11-27 RX ADMIN — HYDRALAZINE HYDROCHLORIDE 10 MG: 20 INJECTION INTRAMUSCULAR; INTRAVENOUS at 09:11

## 2022-11-27 RX ADMIN — MORPHINE SULFATE 4 MG: 4 INJECTION INTRAVENOUS at 12:11

## 2022-11-27 RX ADMIN — HYDRALAZINE HYDROCHLORIDE 10 MG: 20 INJECTION INTRAMUSCULAR; INTRAVENOUS at 12:11

## 2022-11-27 RX ADMIN — HYDRALAZINE HYDROCHLORIDE 10 MG: 20 INJECTION INTRAMUSCULAR; INTRAVENOUS at 05:11

## 2022-11-27 RX ADMIN — PIPERACILLIN AND TAZOBACTAM 4.5 G: 4; .5 INJECTION, POWDER, LYOPHILIZED, FOR SOLUTION INTRAVENOUS; PARENTERAL at 04:11

## 2022-11-27 RX ADMIN — PIPERACILLIN AND TAZOBACTAM 4.5 G: 4; .5 INJECTION, POWDER, LYOPHILIZED, FOR SOLUTION INTRAVENOUS; PARENTERAL at 08:11

## 2022-11-27 RX ADMIN — ATORVASTATIN CALCIUM 10 MG: 10 TABLET, FILM COATED ORAL at 08:11

## 2022-11-27 RX ADMIN — MORPHINE SULFATE 4 MG: 4 INJECTION INTRAVENOUS at 09:11

## 2022-11-27 RX ADMIN — PIPERACILLIN AND TAZOBACTAM 4.5 G: 4; .5 INJECTION, POWDER, LYOPHILIZED, FOR SOLUTION INTRAVENOUS; PARENTERAL at 11:11

## 2022-11-27 NOTE — PROGRESS NOTES
Patient seen examined     MRI, CT scan reviewed     We are concerned that there could be gallbladder malignancy or or biliary obstruction     MRI does not demonstrate this, also patient was evaluated by Gastroenterology seems to be no indication for ERCP    We will proceed with lap rose, patient is scheduled for tomorrow November 28th 2022 for laparoscopic cholecystectomy possible open

## 2022-11-28 ENCOUNTER — ANESTHESIA EVENT (OUTPATIENT)
Dept: SURGERY | Facility: HOSPITAL | Age: 77
DRG: 417 | End: 2022-11-28
Payer: MEDICARE

## 2022-11-28 ENCOUNTER — ANESTHESIA (OUTPATIENT)
Dept: SURGERY | Facility: HOSPITAL | Age: 77
DRG: 417 | End: 2022-11-28
Payer: MEDICARE

## 2022-11-28 LAB
ALBUMIN SERPL-MCNC: 2.4 GM/DL (ref 3.4–4.8)
ALBUMIN/GLOB SERPL: 0.9 RATIO (ref 1.1–2)
ALP SERPL-CCNC: 228 UNIT/L (ref 40–150)
ALT SERPL-CCNC: 200 UNIT/L (ref 0–55)
AST SERPL-CCNC: 76 UNIT/L (ref 5–34)
BILIRUBIN DIRECT+TOT PNL SERPL-MCNC: 2.4 MG/DL
BUN SERPL-MCNC: 16.9 MG/DL (ref 8.4–25.7)
CALCIUM SERPL-MCNC: 8.4 MG/DL (ref 8.8–10)
CHLORIDE SERPL-SCNC: 106 MMOL/L (ref 98–107)
CO2 SERPL-SCNC: 24 MMOL/L (ref 23–31)
CREAT SERPL-MCNC: 0.94 MG/DL (ref 0.73–1.18)
GFR SERPLBLD CREATININE-BSD FMLA CKD-EPI: >60 MLS/MIN/1.73/M2
GLOBULIN SER-MCNC: 2.7 GM/DL (ref 2.4–3.5)
GLUCOSE SERPL-MCNC: 138 MG/DL (ref 82–115)
POTASSIUM SERPL-SCNC: 3.3 MMOL/L (ref 3.5–5.1)
PROT SERPL-MCNC: 5.1 GM/DL (ref 5.8–7.6)
SODIUM SERPL-SCNC: 140 MMOL/L (ref 136–145)

## 2022-11-28 PROCEDURE — 27201423 OPTIME MED/SURG SUP & DEVICES STERILE SUPPLY: Performed by: STUDENT IN AN ORGANIZED HEALTH CARE EDUCATION/TRAINING PROGRAM

## 2022-11-28 PROCEDURE — 36000708 HC OR TIME LEV III 1ST 15 MIN: Performed by: STUDENT IN AN ORGANIZED HEALTH CARE EDUCATION/TRAINING PROGRAM

## 2022-11-28 PROCEDURE — 25000003 PHARM REV CODE 250: Performed by: ANESTHESIOLOGY

## 2022-11-28 PROCEDURE — 21400001 HC TELEMETRY ROOM

## 2022-11-28 PROCEDURE — 63600175 PHARM REV CODE 636 W HCPCS: Performed by: PHYSICIAN ASSISTANT

## 2022-11-28 PROCEDURE — 63600175 PHARM REV CODE 636 W HCPCS: Performed by: INTERNAL MEDICINE

## 2022-11-28 PROCEDURE — 63600175 PHARM REV CODE 636 W HCPCS: Performed by: NURSE ANESTHETIST, CERTIFIED REGISTERED

## 2022-11-28 PROCEDURE — 71000033 HC RECOVERY, INTIAL HOUR: Performed by: STUDENT IN AN ORGANIZED HEALTH CARE EDUCATION/TRAINING PROGRAM

## 2022-11-28 PROCEDURE — 25000003 PHARM REV CODE 250: Performed by: INTERNAL MEDICINE

## 2022-11-28 PROCEDURE — 63600175 PHARM REV CODE 636 W HCPCS: Performed by: EMERGENCY MEDICINE

## 2022-11-28 PROCEDURE — 63600175 PHARM REV CODE 636 W HCPCS: Performed by: ANESTHESIOLOGY

## 2022-11-28 PROCEDURE — 37000009 HC ANESTHESIA EA ADD 15 MINS: Performed by: STUDENT IN AN ORGANIZED HEALTH CARE EDUCATION/TRAINING PROGRAM

## 2022-11-28 PROCEDURE — 25000003 PHARM REV CODE 250: Performed by: NURSE ANESTHETIST, CERTIFIED REGISTERED

## 2022-11-28 PROCEDURE — 88304 TISSUE EXAM BY PATHOLOGIST: CPT | Performed by: STUDENT IN AN ORGANIZED HEALTH CARE EDUCATION/TRAINING PROGRAM

## 2022-11-28 PROCEDURE — 25000003 PHARM REV CODE 250: Performed by: PHYSICIAN ASSISTANT

## 2022-11-28 PROCEDURE — 63600175 PHARM REV CODE 636 W HCPCS: Performed by: STUDENT IN AN ORGANIZED HEALTH CARE EDUCATION/TRAINING PROGRAM

## 2022-11-28 PROCEDURE — 80053 COMPREHEN METABOLIC PANEL: CPT | Performed by: STUDENT IN AN ORGANIZED HEALTH CARE EDUCATION/TRAINING PROGRAM

## 2022-11-28 PROCEDURE — 25000003 PHARM REV CODE 250: Performed by: STUDENT IN AN ORGANIZED HEALTH CARE EDUCATION/TRAINING PROGRAM

## 2022-11-28 PROCEDURE — 25000003 PHARM REV CODE 250: Performed by: EMERGENCY MEDICINE

## 2022-11-28 PROCEDURE — 27000221 HC OXYGEN, UP TO 24 HOURS

## 2022-11-28 PROCEDURE — 36000709 HC OR TIME LEV III EA ADD 15 MIN: Performed by: STUDENT IN AN ORGANIZED HEALTH CARE EDUCATION/TRAINING PROGRAM

## 2022-11-28 PROCEDURE — 37000008 HC ANESTHESIA 1ST 15 MINUTES: Performed by: STUDENT IN AN ORGANIZED HEALTH CARE EDUCATION/TRAINING PROGRAM

## 2022-11-28 RX ORDER — BUPIVACAINE HYDROCHLORIDE AND EPINEPHRINE 5; 5 MG/ML; UG/ML
INJECTION, SOLUTION EPIDURAL; INTRACAUDAL; PERINEURAL
Status: DISCONTINUED | OUTPATIENT
Start: 2022-11-28 | End: 2022-11-28 | Stop reason: HOSPADM

## 2022-11-28 RX ORDER — ALBUMIN HUMAN 250 G/1000ML
SOLUTION INTRAVENOUS CONTINUOUS PRN
Status: DISCONTINUED | OUTPATIENT
Start: 2022-11-28 | End: 2022-11-28

## 2022-11-28 RX ORDER — AMLODIPINE BESYLATE 5 MG/1
10 TABLET ORAL DAILY
Status: DISCONTINUED | OUTPATIENT
Start: 2022-11-29 | End: 2022-11-30 | Stop reason: HOSPADM

## 2022-11-28 RX ORDER — ACETAMINOPHEN 10 MG/ML
1000 INJECTION, SOLUTION INTRAVENOUS
Status: COMPLETED | OUTPATIENT
Start: 2022-11-28 | End: 2022-11-28

## 2022-11-28 RX ORDER — MIDAZOLAM HYDROCHLORIDE 1 MG/ML
INJECTION INTRAMUSCULAR; INTRAVENOUS
Status: DISCONTINUED | OUTPATIENT
Start: 2022-11-28 | End: 2022-11-28

## 2022-11-28 RX ORDER — PROPOFOL 10 MG/ML
VIAL (ML) INTRAVENOUS
Status: DISCONTINUED | OUTPATIENT
Start: 2022-11-28 | End: 2022-11-28

## 2022-11-28 RX ORDER — LIDOCAINE HYDROCHLORIDE 20 MG/ML
INJECTION INTRAVENOUS
Status: DISCONTINUED | OUTPATIENT
Start: 2022-11-28 | End: 2022-11-28

## 2022-11-28 RX ORDER — FENTANYL CITRATE 50 UG/ML
INJECTION, SOLUTION INTRAMUSCULAR; INTRAVENOUS
Status: DISCONTINUED | OUTPATIENT
Start: 2022-11-28 | End: 2022-11-28

## 2022-11-28 RX ORDER — POTASSIUM CHLORIDE 14.9 MG/ML
20 INJECTION INTRAVENOUS ONCE
Status: COMPLETED | OUTPATIENT
Start: 2022-11-28 | End: 2022-11-28

## 2022-11-28 RX ORDER — PHENYLEPHRINE HCL IN 0.9% NACL 1 MG/10 ML
SYRINGE (ML) INTRAVENOUS
Status: DISCONTINUED | OUTPATIENT
Start: 2022-11-28 | End: 2022-11-28

## 2022-11-28 RX ORDER — HYDROMORPHONE HYDROCHLORIDE 2 MG/ML
0.4 INJECTION, SOLUTION INTRAMUSCULAR; INTRAVENOUS; SUBCUTANEOUS EVERY 10 MIN PRN
Status: DISCONTINUED | OUTPATIENT
Start: 2022-11-28 | End: 2022-11-30 | Stop reason: HOSPADM

## 2022-11-28 RX ORDER — LABETALOL HYDROCHLORIDE 5 MG/ML
INJECTION, SOLUTION INTRAVENOUS
Status: DISPENSED
Start: 2022-11-28 | End: 2022-11-28

## 2022-11-28 RX ORDER — HYDRALAZINE HYDROCHLORIDE 20 MG/ML
15 INJECTION INTRAMUSCULAR; INTRAVENOUS
Status: DISCONTINUED | OUTPATIENT
Start: 2022-11-28 | End: 2022-11-30 | Stop reason: HOSPADM

## 2022-11-28 RX ORDER — LABETALOL HYDROCHLORIDE 5 MG/ML
5 INJECTION, SOLUTION INTRAVENOUS ONCE
Status: COMPLETED | OUTPATIENT
Start: 2022-11-28 | End: 2022-11-28

## 2022-11-28 RX ORDER — GLYCOPYRROLATE 0.2 MG/ML
INJECTION INTRAMUSCULAR; INTRAVENOUS
Status: DISCONTINUED | OUTPATIENT
Start: 2022-11-28 | End: 2022-11-28

## 2022-11-28 RX ORDER — IPRATROPIUM BROMIDE AND ALBUTEROL SULFATE 2.5; .5 MG/3ML; MG/3ML
3 SOLUTION RESPIRATORY (INHALATION) EVERY 4 HOURS PRN
Status: DISCONTINUED | OUTPATIENT
Start: 2022-11-28 | End: 2022-11-30 | Stop reason: HOSPADM

## 2022-11-28 RX ORDER — ONDANSETRON 2 MG/ML
4 INJECTION INTRAMUSCULAR; INTRAVENOUS ONCE AS NEEDED
Status: DISCONTINUED | OUTPATIENT
Start: 2022-11-28 | End: 2022-11-30 | Stop reason: HOSPADM

## 2022-11-28 RX ORDER — ROCURONIUM BROMIDE 10 MG/ML
INJECTION, SOLUTION INTRAVENOUS
Status: DISCONTINUED | OUTPATIENT
Start: 2022-11-28 | End: 2022-11-28

## 2022-11-28 RX ORDER — ONDANSETRON 2 MG/ML
INJECTION INTRAMUSCULAR; INTRAVENOUS
Status: DISCONTINUED | OUTPATIENT
Start: 2022-11-28 | End: 2022-11-28

## 2022-11-28 RX ORDER — SODIUM CHLORIDE, SODIUM LACTATE, POTASSIUM CHLORIDE, CALCIUM CHLORIDE 600; 310; 30; 20 MG/100ML; MG/100ML; MG/100ML; MG/100ML
INJECTION, SOLUTION INTRAVENOUS CONTINUOUS
Status: ACTIVE | OUTPATIENT
Start: 2022-11-28 | End: 2022-11-28

## 2022-11-28 RX ORDER — HEPARIN SODIUM 5000 [USP'U]/ML
5000 INJECTION, SOLUTION INTRAVENOUS; SUBCUTANEOUS EVERY 12 HOURS
Status: DISCONTINUED | OUTPATIENT
Start: 2022-11-29 | End: 2022-11-30 | Stop reason: HOSPADM

## 2022-11-28 RX ADMIN — MORPHINE SULFATE 4 MG: 4 INJECTION INTRAVENOUS at 02:11

## 2022-11-28 RX ADMIN — PIPERACILLIN AND TAZOBACTAM 4.5 G: 4; .5 INJECTION, POWDER, LYOPHILIZED, FOR SOLUTION INTRAVENOUS; PARENTERAL at 11:11

## 2022-11-28 RX ADMIN — PROPOFOL 130 MG: 10 INJECTION, EMULSION INTRAVENOUS at 09:11

## 2022-11-28 RX ADMIN — HYDRALAZINE HYDROCHLORIDE 10 MG: 20 INJECTION INTRAMUSCULAR; INTRAVENOUS at 03:11

## 2022-11-28 RX ADMIN — ATORVASTATIN CALCIUM 10 MG: 10 TABLET, FILM COATED ORAL at 02:11

## 2022-11-28 RX ADMIN — MIDAZOLAM HYDROCHLORIDE 2 MG: 1 INJECTION, SOLUTION INTRAMUSCULAR; INTRAVENOUS at 08:11

## 2022-11-28 RX ADMIN — ACETAMINOPHEN 650 MG: 325 TABLET ORAL at 03:11

## 2022-11-28 RX ADMIN — POTASSIUM CHLORIDE 20 MEQ: 14.9 INJECTION, SOLUTION INTRAVENOUS at 06:11

## 2022-11-28 RX ADMIN — FENTANYL CITRATE 100 MCG: 50 INJECTION, SOLUTION INTRAMUSCULAR; INTRAVENOUS at 09:11

## 2022-11-28 RX ADMIN — ROCURONIUM BROMIDE 50 MG: 50 INJECTION INTRAVENOUS at 09:11

## 2022-11-28 RX ADMIN — PIPERACILLIN AND TAZOBACTAM 4.5 G: 4; .5 INJECTION, POWDER, LYOPHILIZED, FOR SOLUTION INTRAVENOUS; PARENTERAL at 04:11

## 2022-11-28 RX ADMIN — ONDANSETRON 4 MG: 2 INJECTION INTRAMUSCULAR; INTRAVENOUS at 09:11

## 2022-11-28 RX ADMIN — SODIUM CHLORIDE, SODIUM GLUCONATE, SODIUM ACETATE, POTASSIUM CHLORIDE AND MAGNESIUM CHLORIDE: 526; 502; 368; 37; 30 INJECTION, SOLUTION INTRAVENOUS at 08:11

## 2022-11-28 RX ADMIN — FENTANYL CITRATE 50 MCG: 50 INJECTION, SOLUTION INTRAMUSCULAR; INTRAVENOUS at 09:11

## 2022-11-28 RX ADMIN — ACETAMINOPHEN 1000 MG: 10 INJECTION, SOLUTION INTRAVENOUS at 11:11

## 2022-11-28 RX ADMIN — Medication 100 MCG: at 10:11

## 2022-11-28 RX ADMIN — PIPERACILLIN AND TAZOBACTAM 4.5 G: 4; .5 INJECTION, POWDER, LYOPHILIZED, FOR SOLUTION INTRAVENOUS; PARENTERAL at 09:11

## 2022-11-28 RX ADMIN — SUGAMMADEX 200 MG: 100 INJECTION, SOLUTION INTRAVENOUS at 11:11

## 2022-11-28 RX ADMIN — FENTANYL CITRATE 100 MCG: 50 INJECTION, SOLUTION INTRAMUSCULAR; INTRAVENOUS at 11:11

## 2022-11-28 RX ADMIN — FENTANYL CITRATE 50 MCG: 50 INJECTION, SOLUTION INTRAMUSCULAR; INTRAVENOUS at 10:11

## 2022-11-28 RX ADMIN — ALBUMIN HUMAN: 250 SOLUTION INTRAVENOUS at 09:11

## 2022-11-28 RX ADMIN — HYDRALAZINE HYDROCHLORIDE 10 MG: 20 INJECTION INTRAMUSCULAR; INTRAVENOUS at 02:11

## 2022-11-28 RX ADMIN — HYDRALAZINE HYDROCHLORIDE 15 MG: 20 INJECTION INTRAMUSCULAR; INTRAVENOUS at 06:11

## 2022-11-28 RX ADMIN — LIDOCAINE HYDROCHLORIDE 60 MG: 20 INJECTION INTRAVENOUS at 09:11

## 2022-11-28 RX ADMIN — LABETALOL HYDROCHLORIDE 5 MG: 5 INJECTION, SOLUTION INTRAVENOUS at 11:11

## 2022-11-28 RX ADMIN — AMLODIPINE BESYLATE 5 MG: 5 TABLET ORAL at 02:11

## 2022-11-28 RX ADMIN — GLYCOPYRROLATE 0.2 MG: 0.2 INJECTION INTRAMUSCULAR; INTRAVENOUS at 08:11

## 2022-11-28 RX ADMIN — SODIUM CHLORIDE, POTASSIUM CHLORIDE, SODIUM LACTATE AND CALCIUM CHLORIDE: 600; 310; 30; 20 INJECTION, SOLUTION INTRAVENOUS at 12:11

## 2022-11-28 RX ADMIN — MORPHINE SULFATE 4 MG: 4 INJECTION INTRAVENOUS at 10:11

## 2022-11-28 NOTE — TRANSFER OF CARE
"Anesthesia Transfer of Care Note    Patient: Blake Neville    Procedure(s) Performed: Procedure(s) (LRB):  CHOLECYSTECTOMY, LAPAROSCOPIC (N/A)    Patient location: PACU    Anesthesia Type: general    Transport from OR: Transported from OR on room air with adequate spontaneous ventilation    Post pain: adequate analgesia    Post assessment: no apparent anesthetic complications and tolerated procedure well    Post vital signs: stable    Level of consciousness: awake and alert    Nausea/Vomiting: no nausea/vomiting    Complications: none    Transfer of care protocol was followed      Last vitals:   Visit Vitals  BP (!) 186/130 (BP Location: Left arm, Patient Position: Sitting)   Pulse 78   Temp 36.5 °C (97.7 °F) (Oral)   Resp 20   Ht 5' 10" (1.778 m)   Wt 95.3 kg (210 lb)   SpO2 (!) 94%   BMI 30.13 kg/m²     "

## 2022-11-28 NOTE — PROGRESS NOTES
[facility]   Progress Note  Trauma and Acute Care Surgery    Patient Name: Blake Neville  YOB: 1945  Date of Admission: 11/25/2022  Date: 11/28/2022 6:47 AM    HD#2  POD#* No surgery date entered *    Interval   NAEON  AF  SBP up to 192, consistently hypertensive since admission  All other VSS  NPO since midnight  Resting comfortably this AM     PAST HISTORY:   Past medical history:  History reviewed. No pertinent past medical history.    Past surgical history:  No past surgical history on file.    Family history:  No family history on file.    Social history:  Social History     Socioeconomic History    Marital status:    Substance and Sexual Activity    Alcohol use: Not Currently    Drug use: Not Currently    Sexual activity: Yes     Social History     Tobacco Use   Smoking Status Not on file   Smokeless Tobacco Not on file         MEDICATIONS & ALLERGIES:   Allergies:   Review of patient's allergies indicates:   Allergen Reactions    Aspirin      asthma       No current facility-administered medications on file prior to encounter.     Current Outpatient Medications on File Prior to Encounter   Medication Sig    amLODIPine (NORVASC) 5 MG tablet Take 5 mg by mouth once daily.    HYDROcodone-acetaminophen (NORCO)  mg per tablet Take 1 tablet by mouth every 6 (six) hours as needed for Pain.    ondansetron (ZOFRAN) 4 MG tablet Take 1 tablet (4 mg total) by mouth every 6 (six) hours.    simvastatin (ZOCOR) 40 MG tablet Take 40 mg by mouth once daily.    tamsulosin (FLOMAX) 0.4 mg Cap Take 1 capsule by mouth once daily.       Scheduled Meds:   amLODIPine  2.5 mg Oral Once    amLODIPine  5 mg Oral Daily    atorvastatin  10 mg Oral Daily    piperacillin-tazobactam (ZOSYN) IVPB  4.5 g Intravenous Q8H       Continuous Infusions:   lactated ringers 125 mL/hr at 11/28/22 0016       PRN Meds:acetaminophen, acetaminophen, dextrose 10%, dextrose 10%, glucagon (human recombinant), glucose,  glucose, hydrALAZINE, morphine, ondansetron, polyethylene glycol, sodium chloride 0.9%    OBJECTIVE:     Vital Signs:  Temp:  [98 °F (36.7 °C)-98.2 °F (36.8 °C)] 98 °F (36.7 °C)  Pulse:  [64-77] 74  Resp:  [18-20] 18  SpO2:  [92 %-94 %] 93 %  BP: (169-193)/(73-86) 169/73  Body mass index is 30.13 kg/m².     I/O last 3 completed shifts:  In: 1270 [P.O.:270; I.V.:1000]  Out: 650 [Urine:650]  No intake/output data recorded.    Physical Exam:  GEN: NAD   NEURO: AAOx3   HEENT: Acephalic   RESP: No increased WOB, equal rise and fall of the chest   CV: Regular rate   ABD: Soft, tender in RUQ, arreola sign negative non distended.   EXT: Moving all extremities spontaneously     Laboratory:  Recent Labs     11/25/22 1329 11/27/22  0936   WBC 19.5* 15.9*   HGB 14.3 13.6*   HCT 44.6 40.9*    274     Recent Labs     11/25/22 1329 11/27/22  0936    139   K 4.2 3.5   CO2 27 24   BUN 19.3 17.5   CREATININE 1.02 0.96   CALCIUM 9.2 9.0   ALBUMIN 3.0* 2.5*   BILITOT 1.5 3.6*   AST 27 230*   ALKPHOS 85 280*   ALT 66* 292*     Troponin:  No results for input(s): TROPONINI in the last 72 hours.  CBC:  Recent Labs     11/25/22 1329 11/27/22  0936   WBC 19.5* 15.9*   RBC 4.79 4.47*   HGB 14.3 13.6*   HCT 44.6 40.9*    274   MCV 93.1 91.5   MCH 29.9 30.4   MCHC 32.1* 33.3     CMP:  Recent Labs     11/25/22 1329 11/27/22  0936   CALCIUM 9.2 9.0   ALBUMIN 3.0* 2.5*    139   K 4.2 3.5   CO2 27 24   BUN 19.3 17.5   CREATININE 1.02 0.96   ALKPHOS 85 280*   ALT 66* 292*   AST 27 230*   BILITOT 1.5 3.6*     Lactic Acid:  No results for input(s): LACTATE in the last 72 hours.  Etoh:  No results for input(s): ALCOHOLMEDIC in the last 72 hours.  Drug Screen:  No results for input(s): PCDSCOMETHA, COCAINEMETAB, OPIATESCREEN, BARBITURATES, AMPHETAMINES, MARIJUANATHC, PCDSOPHENCYN, CREATRANDUR, TOXINFO in the last 72 hours.    ABG:  No results for input(s): PH, PCO2, PO2, HCO3, BE, POCSATURATED in the last 72  hours.    Diagnostic Results:  No results found in the last 24 hours.  MRI MRCP Without Contrast   Final Result      1. Mild nonspecific gallbladder wall edema and trace pericholecystic fluid with no gallstones seen.   2. Mild extrahepatic biliary ductal dilatation.  A filling defect within the proximal common bile duct is favored artifactual.  No distal common bile duct filling defects seen.   3. Mild acute interstitial pancreatitis.   4. Trace ascites.         Electronically signed by: Prabhakar Nichols   Date:    11/26/2022   Time:    10:41          Microbiology:  Microbiology Results (last 7 days)       ** No results found for the last 168 hours. **             ASSESSMENT & PLAN:   Blake Neville is a 77 y.o. male who presented to the emergency department with cholelithiasis and leukocytosis following recent pancreatitis episode early this week. US findings also showed concern for a possible gallbladder mass at OSH.     Plan:  - OR today for laparoscopic cholecystectomy  - NPO until after procedure  - FU AM labs  - Rest of care per primary    Gen Florian MD  U General Surgery PGY1  11/28/2022  6:47 AM

## 2022-11-28 NOTE — ANESTHESIA POSTPROCEDURE EVALUATION
Anesthesia Post Evaluation    Patient: Blake Neville    Procedure(s) Performed: Procedure(s) (LRB):  CHOLECYSTECTOMY, LAPAROSCOPIC (N/A)    Final Anesthesia Type: general      Patient location during evaluation: PACU  Patient participation: Yes- Able to Participate  Level of consciousness: awake and alert, awake and oriented  Post-procedure vital signs: reviewed and stable  Pain management: adequate  Airway patency: patent    PONV status at discharge: No PONV  Anesthetic complications: no      Cardiovascular status: blood pressure returned to baseline, hemodynamically stable and stable  Respiratory status: unassisted, spontaneous ventilation and room air  Hydration status: euvolemic  Follow-up not needed.          Vitals Value Taken Time   /83 11/28/22 1211   Temp 36.8 °C (98.3 °F) 11/28/22 1130   Pulse 66 11/28/22 1211   Resp 20 11/28/22 1211   SpO2 94 % 11/28/22 1211   Vitals shown include unvalidated device data.      Event Time   Out of Recovery 11/28/2022 12:25:00         Pain/Makayla Score: Pain Rating Prior to Med Admin: 3 (11/28/2022 11:51 AM)  Pain Rating Post Med Admin: 0 (11/28/2022  2:36 AM)  Makayla Score: 8 (11/28/2022 12:25 PM)

## 2022-11-28 NOTE — ANESTHESIA PREPROCEDURE EVALUATION
11/28/2022  Blake Neville is a 77 y.o., male   Pre-operative evaluation for Procedure(s) (LRB):  CHOLECYSTECTOMY, LAPAROSCOPIC (N/A)    Blake Neville is a 77 y.o. male severely HTN 11/28/2022 in ER 0647: .  Notes 11/27/2022 184/74 PMHx: hypertension, hyperlipidemia, BPH and kidney cancer status post right nephrectomy three and a half years ago.        Review of patient's allergies indicates:   Allergen Reactions    Aspirin      asthma       No current facility-administered medications on file prior to encounter.     Current Outpatient Medications on File Prior to Encounter   Medication Sig Dispense Refill    amLODIPine (NORVASC) 5 MG tablet Take 5 mg by mouth once daily.      HYDROcodone-acetaminophen (NORCO)  mg per tablet Take 1 tablet by mouth every 6 (six) hours as needed for Pain. 15 tablet 0    ondansetron (ZOFRAN) 4 MG tablet Take 1 tablet (4 mg total) by mouth every 6 (six) hours. 12 tablet 0    simvastatin (ZOCOR) 40 MG tablet Take 40 mg by mouth once daily.      tamsulosin (FLOMAX) 0.4 mg Cap Take 1 capsule by mouth once daily.         No past surgical history on file.          CBC:   Recent Labs     11/25/22  1329 11/27/22  0936   WBC 19.5* 15.9*   RBC 4.79 4.47*   HGB 14.3 13.6*   HCT 44.6 40.9*    274   MCV 93.1 91.5   MCH 29.9 30.4   MCHC 32.1* 33.3       CMP:   Recent Labs     11/25/22  1329 11/27/22  0936    139   K 4.2 3.5   CO2 27 24   BUN 19.3 17.5   CREATININE 1.02 0.96   CALCIUM 9.2 9.0   ALBUMIN 3.0* 2.5*   ALKPHOS 85 280*   ALT 66* 292*   AST 27 230*   BILITOT 1.5 3.6*       INR  No results for input(s): PT, INR, PROTIME, APTT in the last 72 hours.        Diagnostic Studies:  CXR 11/23/2022: NAPD  Bibasilar atelectasis CT Abd.    EKG 11/22/2022: NSR=67. Poss inf inafrct.        2D Echo :  No results found for this or any previous visit..      Pre-op  Assessment    I have reviewed the Patient Summary Reports.     I have reviewed the Nursing Notes. I have reviewed the NPO Status.   I have reviewed the Medications.     Review of Systems  Anesthesia Hx:  History of prior surgery of interest to airway management or planning: nephrectomy. Previous anesthesia: General R Nephrectomy for CA; Ing hernia repair. with general anesthesia.  Airway issues documented on chart review include GETA  Denies Family Hx of Anesthesia complications.   Denies Personal Hx of Anesthesia complications.   Social:  Non-Smoker, No Alcohol Use, Former Smoker Quit tob 40 yrs ago. Occas 2beer 2x month.   Hematology/Oncology:  Hematology Normal   Oncology Normal   Hematology Comments: kidney cancer status post right nephrectomy three and a half years ago.      EENT/Dental:EENT/Dental Normal   Cardiovascular:   Exercise tolerance: good Denies Pacemaker. Hypertension  Denies MI.    Denies Angina. hyperlipidemia ECG has been reviewed. 3 mi /2x week   Pulmonary:   Asthma Sleep Apnea, CPAP Last asthma and last inh: 30 yrs ago.  Compliant CPAP. HS.   Education provided regarding risk of obstructive sleep apnea     Renal/:   Chronic Renal Disease BPH kidney cancer status post right nephrectomy three and a half years ago.   Hepatic/GI:   Liver Disease, Steatosis.   Musculoskeletal:  Musculoskeletal Normal    Neurological:  Neurology Normal    Endocrine:  Endocrine Normal    Dermatological:  Skin Normal    Psych:  Psychiatric Normal           Physical Exam  General: Well nourished, Cooperative, Alert and Oriented    Airway:  Mallampati: II / I  Mouth Opening: Normal  TM Distance: > 6 cm  Tongue: Normal  Neck ROM: Normal ROM    Dental:  Intact, Partial Dentures  Px denies any loose teeth.  Chest/Lungs:  Clear to auscultation, Normal Respiratory Rate  RA sat 94%  Heart:  Rate: Normal  Rhythm: Regular Rhythm    Abdomen:  Normal, Soft        Anesthesia Plan  Type of Anesthesia, risks & benefits  discussed:    Anesthesia Type: Gen ETT  Intra-op Monitoring Plan: Standard ASA Monitors  Post Op Pain Control Plan: multimodal analgesia  Induction:  IV  Airway Plan: Direct  Informed Consent: Informed consent signed with the Patient and all parties understand the risks and agree with anesthesia plan.  All questions answered.   ASA Score: 3  Day of Surgery Review of History & Physical: H&P Update referred to the surgeon/provider.I have interviewed and examined the patient. I have reviewed the patient's H&P dated:     Ready For Surgery From Anesthesia Perspective.     .

## 2022-11-28 NOTE — BRIEF OP NOTE
[facility] - 8th Floor Med Surg  Brief Operative Note    SUMMARY     Surgery Date: 11/28/2022     Surgeon(s) and Role:     * Michael Nielsen MD - Primary    Assisting Surgeon: None    Pre-op Diagnosis:  Acute cholecystitis [K81.0]    Post-op Diagnosis:  Post-Op Diagnosis Codes:     * Acute cholecystitis [K81.0]    Procedure(s) (LRB):  CHOLECYSTECTOMY, LAPAROSCOPIC (N/A)    Anesthesia: General    Operative Findings: Laparoscopic cholecystectomy due to cholecystitis. See official Op-note for full details.     Estimated Blood Loss: * No values recorded between 11/28/2022  9:48 AM and 11/28/2022 11:22 AM *    Estimated Blood Loss has been documented.         Specimens:   Specimen (24h ago, onward)       Start     Ordered    11/28/22 0951  Specimen to Pathology  RELEASE UPON ORDERING        References:    Click here for ordering Quick Tip   Question:  Release to patient  Answer:  Immediate    11/28/22 6356                    [id number]

## 2022-11-28 NOTE — ANESTHESIA PROCEDURE NOTES
Intubation    Date/Time: 11/28/2022 9:26 AM  Performed by: Sukumar Rebolledo CRNA  Authorized by: Symone Kapoor MD     Intubation:     Induction:  Intravenous    Intubated:  Postinduction    Mask Ventilation:  Easy with oral airway    Attempts:  1    Attempted By:  CRNA    Method of Intubation:  Direct    Blade:  Plata 2    Laryngeal View Grade: Grade I - full view of cords      Difficult Airway Encountered?: No      Complications:  None    Airway Device:  Oral endotracheal tube    Airway Device Size:  7.5    Style/Cuff Inflation:  Cuffed (inflated to minimal occlusive pressure)    Tube secured:  23    Secured at:  The lips    Placement Verified By:  Capnometry    Complicating Factors:  None    Findings Post-Intubation:  BS equal bilateral and atraumatic/condition of teeth unchanged

## 2022-11-28 NOTE — PROGRESS NOTES
[facility] Hospital Medicine Progress Note        Chief Complaint: Inpatient Follow-up for abdominal pain    HPI: Blake Neville is a 77 y.o. White male with a past medical history of hypertension, hyperlipidemia, BPH and kidney cancer status post right nephrectomy three and a half years ago. The patient presented to [facility] on 11/25/2022 as a transfer from [facility] ED with acute cholecystitis needing General surgery services.  Patient was initially seen in the ED on 11/22/2022 with complaints of upper abdominal pain and vomiting and diagnosed with pancreatitis.  He was sent home with pain medication. Patient returned to outside facility ED yesterday (11/25/2022) with complaints right-sided abdominal pain.  Patient reports abdominal pain mostly located to lower abdomen with radiation to the right flank.  He describes pain as intermittent, worsening with movement or coughing and sharp in nature.  Wife at bedside reports patient has been mostly taking in water since Tuesday due to vomiting.  Associated symptoms include low-grade fever of 99° F, burning with urination and sore throat with cough.  He denies complaints of shortness of breath, chest pain, diarrhea and prior pancreatitis.  His last bowel movement was on 11/21/2022 and normal.  He reports drinking alcohol once every 2 weeks.     Upon presentation to the ED, patient afebrile, hypertensive 174/88 and SpO2 95% on room air.  Labs revealed WBC 19.5, glucose 165, AST 66.  CA 19-9 elevated at 66.41.  CT abdomen and pelvis revealed findings consistent with pancreatitis unchanged since prior exam without evidence of necrotizing pancreatitis, hepatic steatosis, small amount of perihepatic fluid which is a new finding and most likely reactive, bibasilar atelectasis and left lower lobe punctate nodule with recommended follow-up in 6-12 months.  Right upper quadrant abdominal ultrasound revealed an abnormal appearing gallbladder, some  gallstones in the gallbladder with a questionable mass in the gallbladder, hepatomegaly with findings consistent with hepatic steatosis and pancreas not visualized due to overlying bowel gas.  At outside facility patient was started on Zosyn and received Zofran and morphine.  Patient was transferred to [facility]. General surgery consulted and recommended GI consult.  Patient admitted to hospital medicine services and further medical management.    MRCP-not suggestive of any mass which was a concern initially, No ERCP    Lap Eileen on 11/28/22 planned by surgery.NPO.    Hypertensive , added amlodipine 10mg daily, prn hydralazine.      Interval Hx: Going for surgery, No nausea/vomiting/fever.      Objective/physical exam:  General: In no acute distress, afebrile  Chest: Clear to auscultation bilaterally  Heart: RRR, +S1, S2, no appreciable murmur  Abdomen: Soft, tender, BS +  MSK: Warm, no lower extremity edema, no clubbing or cyanosis  Neurologic: Alert and oriented x4, Cranial nerve II-XII intact, Strength 5/5 in all 4 extremities    VITAL SIGNS: 24 HRS MIN & MAX LAST   Temp  Min: 97.7 °F (36.5 °C)  Max: 98.3 °F (36.8 °C) 98.3 °F (36.8 °C)   BP  Min: 153/83  Max: 193/77 (!) 188/93     Pulse  Min: 64  Max: 83  77   Resp  Min: 17  Max: 20 20   SpO2  Min: 90 %  Max: 96 % (!) 90 %         Recent Labs   Lab 11/22/22  2310 11/25/22  1329 11/27/22  0936   WBC 17.5* 19.5* 15.9*   RBC 4.91 4.79 4.47*   HGB 14.8 14.3 13.6*   HCT 44.8 44.6 40.9*   MCV 91.2 93.1 91.5   MCH 30.1 29.9 30.4   MCHC 33.0 32.1* 33.3   RDW 13.5 14.3 14.6    328 274   MPV 10.0 11.7* 10.3       Recent Labs   Lab 11/25/22  1329 11/27/22  0936 11/28/22  0852    139 140   K 4.2 3.5 3.3*   CO2 27 24 24   BUN 19.3 17.5 16.9   CREATININE 1.02 0.96 0.94   CALCIUM 9.2 9.0 8.4*   ALBUMIN 3.0* 2.5* 2.4*   ALKPHOS 85 280* 228*   ALT 66* 292* 200*   AST 27 230* 76*   BILITOT 1.5 3.6* 2.4*          Microbiology Results (last 7 days)       ** No results found  for the last 168 hours. **             See below for Radiology    Scheduled Med:   [START ON 11/29/2022] amLODIPine  10 mg Oral Daily    amLODIPine  2.5 mg Oral Once    atorvastatin  10 mg Oral Daily    labetaloL        piperacillin-tazobactam (ZOSYN) IVPB  4.5 g Intravenous Q8H        Continuous Infusions:   lactated ringers 125 mL/hr at 11/28/22 0016        PRN Meds:  acetaminophen, acetaminophen, artificial tears, dextrose 10%, dextrose 10%, glucagon (human recombinant), glucose, glucose, hydrALAZINE, HYDROmorphone, morphine, ondansetron, ondansetron, polyethylene glycol, sodium chloride 0.9%       Assessment/Plan:  #Intractable paina 2/2 Acute cholecystitis   #Acute pancreatitis  #Elevated CA 19-9  Essential hypertension   Hyperlipidemia     Plan:    - MRCP ordered, no concern for malignancy  - NPO for bowel rest.  IV fluid hydration with lactated Ringer's at 125 mL/hr, Lap Eileen on 11/28/22  - Continue with Zosyn  - Follow results of blood cultures  - IV morphine as needed for pain  - Bowel regimen  - f/u Surgery  - IV Zofran as needed for nausea  - Resume appropriate home medication   - Amlodipine 10mg judy for BP, hydralazine prn         VTE prophylaxis: hep sq    Patient condition: Stable    Anticipated discharge and Disposition:         All diagnosis and differential diagnosis have been reviewed; assessment and plan has been documented; I have personally reviewed the labs and test results that are presently available; I have reviewed the patients medication list; I have reviewed the consulting providers response and recommendations. I have reviewed or attempted to review medical records based upon their availability    All of the patient's questions have been  addressed and answered. Patient's is agreeable to the above stated plan. I will continue to monitor closely and make adjustments to medical management as needed.  _____________________________________________________________________    Nutrition  Status:    Radiology:  MRI MRCP Without Contrast  Narrative: EXAMINATION:  MRI ABDOMEN WITHOUT CONTRAST MRCP    CLINICAL HISTORY:  r/o biliary obstx;    TECHNIQUE:  Multiplanar multisequence MRI of the abdomen performed without gadolinium based IV contrast according to MRCP protocol.    COMPARISON:  CT yesterday    FINDINGS:  Liver: The liver is mildly enlarged and there is hepatic steatosis.  Suspect a miniscule cyst of the right hepatic lobe on image 21 series 5.    Gallbladder and biliary tree: No convincing gallstones.  There is some gallbladder sludge.  Mild gallbladder wall edema.  Trace pericholecystic fluid.  The common bile duct is mildly dilated measuring up to 11 mm.  There is some gradual tapering towards the ampulla.  Filling defect proximal common bile duct only seen on some sequences.  It is not seen on the T2 sequences.  This may be artifactual.  Otherwise no suspicious common bile duct filling defect.    Spleen: Normal.    Pancreas: Minimal dilatation of the main pancreatic duct throughout.  Mild peripancreatic stranding.  No organized peripancreatic collection.    Adrenals: Normal.    Kidneys: Right kidney absent.  No hydronephrosis left kidney.  There is perinephric stranding.    GI tract: No obstruction.  Similar stranding along the root of the mesentery.    Retroperitoneum: No abdominal aortic aneurysm.  No retroperitoneal adenopathy.    Trace ascites.  Impression: 1. Mild nonspecific gallbladder wall edema and trace pericholecystic fluid with no gallstones seen.  2. Mild extrahepatic biliary ductal dilatation.  A filling defect within the proximal common bile duct is favored artifactual.  No distal common bile duct filling defects seen.  3. Mild acute interstitial pancreatitis.  4. Trace ascites.    Electronically signed by: Prabhakar Nichols  Date:    11/26/2022  Time:    10:41      Linda Hatfield MD   11/28/2022

## 2022-11-28 NOTE — PROGRESS NOTES
[facility] Hospital Medicine Progress Note        Chief Complaint: Inpatient Follow-up for abdominal pain    HPI: Blake Neville is a 77 y.o. White male with a past medical history of hypertension, hyperlipidemia, BPH and kidney cancer status post right nephrectomy three and a half years ago. The patient presented to [facility] on 11/25/2022 as a transfer from [facility] ED with acute cholecystitis needing General surgery services.  Patient was initially seen in the ED on 11/22/2022 with complaints of upper abdominal pain and vomiting and diagnosed with pancreatitis.  He was sent home with pain medication. Patient returned to outside facility ED yesterday (11/25/2022) with complaints right-sided abdominal pain.  Patient reports abdominal pain mostly located to lower abdomen with radiation to the right flank.  He describes pain as intermittent, worsening with movement or coughing and sharp in nature.  Wife at bedside reports patient has been mostly taking in water since Tuesday due to vomiting.  Associated symptoms include low-grade fever of 99° F, burning with urination and sore throat with cough.  He denies complaints of shortness of breath, chest pain, diarrhea and prior pancreatitis.  His last bowel movement was on 11/21/2022 and normal.  He reports drinking alcohol once every 2 weeks.     Upon presentation to the ED, patient afebrile, hypertensive 174/88 and SpO2 95% on room air.  Labs revealed WBC 19.5, glucose 165, AST 66.  CA 19-9 elevated at 66.41.  CT abdomen and pelvis revealed findings consistent with pancreatitis unchanged since prior exam without evidence of necrotizing pancreatitis, hepatic steatosis, small amount of perihepatic fluid which is a new finding and most likely reactive, bibasilar atelectasis and left lower lobe punctate nodule with recommended follow-up in 6-12 months.  Right upper quadrant abdominal ultrasound revealed an abnormal appearing gallbladder, some  gallstones in the gallbladder with a questionable mass in the gallbladder, hepatomegaly with findings consistent with hepatic steatosis and pancreas not visualized due to overlying bowel gas.  At outside facility patient was started on Zosyn and received Zofran and morphine.  Patient was transferred to [facility]. General surgery consulted and recommended GI consult.  Patient admitted to hospital medicine services and further medical management.      Lap Eileen on 11/28/22 planned.    Interval Hx: Pain stayed the same, No voming,able to pass gas,underwent MRCP.       Objective/physical exam:  General: In no acute distress, afebrile  Chest: Clear to auscultation bilaterally  Heart: RRR, +S1, S2, no appreciable murmur  Abdomen: Soft, nontender, BS +  MSK: Warm, no lower extremity edema, no clubbing or cyanosis  Neurologic: Alert and oriented x4, Cranial nerve II-XII intact, Strength 5/5 in all 4 extremities    VITAL SIGNS: 24 HRS MIN & MAX LAST   Temp  Min: 98.1 °F (36.7 °C)  Max: 99.3 °F (37.4 °C) 98.2 °F (36.8 °C)   BP  Min: 149/81  Max: 184/74 (!) 184/74     Pulse  Min: 64  Max: 70  65   Resp  Min: 18  Max: 20 18   SpO2  Min: 90 %  Max: 94 % (!) 93 %         Recent Labs   Lab 11/22/22 2310 11/25/22  1329 11/27/22  0936   WBC 17.5* 19.5* 15.9*   RBC 4.91 4.79 4.47*   HGB 14.8 14.3 13.6*   HCT 44.8 44.6 40.9*   MCV 91.2 93.1 91.5   MCH 30.1 29.9 30.4   MCHC 33.0 32.1* 33.3   RDW 13.5 14.3 14.6    328 274   MPV 10.0 11.7* 10.3       Recent Labs   Lab 11/22/22 2310 11/25/22  1329 11/27/22  0936    139 139   K 3.9 4.2 3.5   CO2 24 27 24   BUN 20.0 19.3 17.5   CREATININE 1.38* 1.02 0.96   CALCIUM 9.5 9.2 9.0   ALBUMIN 3.9 3.0* 2.5*   ALKPHOS 110 85 280*   * 66* 292*   * 27 230*   BILITOT 1.5 1.5 3.6*          Microbiology Results (last 7 days)       ** No results found for the last 168 hours. **             See below for Radiology    Scheduled Med:   [START ON 11/28/2022] amLODIPine  5 mg Oral Daily     atorvastatin  10 mg Oral Daily    hydrALAZINE  10 mg Intravenous Once    piperacillin-tazobactam (ZOSYN) IVPB  4.5 g Intravenous Q8H        Continuous Infusions:   lactated ringers 125 mL/hr at 11/26/22 0615        PRN Meds:  acetaminophen, acetaminophen, dextrose 10%, dextrose 10%, glucagon (human recombinant), glucose, glucose, hydrALAZINE, morphine, ondansetron, polyethylene glycol, sodium chloride 0.9%       Assessment/Plan:  Intractable paina 2/2 Acute cholecystitis versus gallbladder mass  Acute pancreatitis  Elevated CA 19-9  Essential hypertension   Hyperlipidemia     Plan:    - MRCP ordered, no concern for malignancy  - NPO for bowel rest.  IV fluid hydration with lactated Ringer's at 125 mL/hr, Lap Eileen on Monday  - Continue with Zosyn  - Follow results of blood cultures  - IV morphine as needed for pain  - IV Zofran as needed for nausea  - Resume appropriate home medication   - Labs in AM           VTE prophylaxis: scds    Patient condition: Stable    Anticipated discharge and Disposition:         All diagnosis and differential diagnosis have been reviewed; assessment and plan has been documented; I have personally reviewed the labs and test results that are presently available; I have reviewed the patients medication list; I have reviewed the consulting providers response and recommendations. I have reviewed or attempted to review medical records based upon their availability    All of the patient's questions have been  addressed and answered. Patient's is agreeable to the above stated plan. I will continue to monitor closely and make adjustments to medical management as needed.  _____________________________________________________________________    Nutrition Status:    Radiology:  MRI MRCP Without Contrast  Narrative: EXAMINATION:  MRI ABDOMEN WITHOUT CONTRAST MRCP    CLINICAL HISTORY:  r/o biliary obstx;    TECHNIQUE:  Multiplanar multisequence MRI of the abdomen performed without gadolinium  based IV contrast according to MRCP protocol.    COMPARISON:  CT yesterday    FINDINGS:  Liver: The liver is mildly enlarged and there is hepatic steatosis.  Suspect a miniscule cyst of the right hepatic lobe on image 21 series 5.    Gallbladder and biliary tree: No convincing gallstones.  There is some gallbladder sludge.  Mild gallbladder wall edema.  Trace pericholecystic fluid.  The common bile duct is mildly dilated measuring up to 11 mm.  There is some gradual tapering towards the ampulla.  Filling defect proximal common bile duct only seen on some sequences.  It is not seen on the T2 sequences.  This may be artifactual.  Otherwise no suspicious common bile duct filling defect.    Spleen: Normal.    Pancreas: Minimal dilatation of the main pancreatic duct throughout.  Mild peripancreatic stranding.  No organized peripancreatic collection.    Adrenals: Normal.    Kidneys: Right kidney absent.  No hydronephrosis left kidney.  There is perinephric stranding.    GI tract: No obstruction.  Similar stranding along the root of the mesentery.    Retroperitoneum: No abdominal aortic aneurysm.  No retroperitoneal adenopathy.    Trace ascites.  Impression: 1. Mild nonspecific gallbladder wall edema and trace pericholecystic fluid with no gallstones seen.  2. Mild extrahepatic biliary ductal dilatation.  A filling defect within the proximal common bile duct is favored artifactual.  No distal common bile duct filling defects seen.  3. Mild acute interstitial pancreatitis.  4. Trace ascites.    Electronically signed by: Prabhakar Nichols  Date:    11/26/2022  Time:    10:41      Linda Hatfield MD   11/27/2022

## 2022-11-28 NOTE — OP NOTE
CHOLECYSTECTOMY, LAPAROSCOPIC  Procedure Note    Blake Neville  11/28/2022      Pre-op Diagnosis: Acute cholecystitis [K81.0]       Post-op Diagnosis: same    Procedure(s):  CHOLECYSTECTOMY, LAPAROSCOPIC    Surgeon(s):  Michael Nielsen MD    Anesthesia: General    Staff:   Circulator: Zarina Gaming RN; Rey Betts RN  Scrub Person: ST Judi; ST Jayson    Estimated Blood Loss: minimal               Specimens: gallbladder      Drains: None    Operative Technique:  Risks and benefits were discussed with patient and family at bedside, informed consent signed.  Patient was taken to the OR and placed supine, endotracheal intubation was successful.  The abdomen was then prepped and draped in sterile fashion.  A time out was completed to confirm correct patient, procedure, and all staff was in agreement.      The Veress needle was introduced through a stab incision in the left upper quadrant and the abdomen was insufflated.  A 5mm optical port was then used to enter the abdomen under direct visualization without issue.  Two additional 5 millimeter ports were placed in the right upper quadrant followed by a 12mm trocar placed subxiphoid.  The gallbladder was then visualized and retracted both superiorly and laterally.  An additional 5mm port was placed to aid in retractation.  Dissection was carried out in lateral to medial fashion.  The cystic duct and cystic artery were both dissected from surrounding tissue and the critical view of safety was achieved.  Two clips were placed twice proximally on each structure and one distally and then transected.  The gallbladder was then removed from the liver bed using the hook cautery and passed off the table as specimen through the subxiphoid trocar port site.  Attention was then redirected to the gallbladder fossa, no active bleeding was noted.  Good hemostasis was visualized.  All ports were then removed under direct visualization with no active bleeding noted.   Skin was then closed with 4-0 mono suture.  Skin was  cleaned and dry sterile dressing was placed on the wound.  Lap and instrument  counts were correct X2 at the end of the case.  Patient tolerated the procedure well and was transferred to recovery room in good condition.     SURGEON:  Michael Nielsen MD    Date: 11/28/2022  Time: 12:49 PM

## 2022-11-29 LAB
ALBUMIN SERPL-MCNC: 2.5 GM/DL (ref 3.4–4.8)
ALBUMIN/GLOB SERPL: 1 RATIO (ref 1.1–2)
ALP SERPL-CCNC: 171 UNIT/L (ref 40–150)
ALT SERPL-CCNC: 119 UNIT/L (ref 0–55)
AST SERPL-CCNC: 41 UNIT/L (ref 5–34)
BILIRUBIN DIRECT+TOT PNL SERPL-MCNC: 1.6 MG/DL
BUN SERPL-MCNC: 17.8 MG/DL (ref 8.4–25.7)
CALCIUM SERPL-MCNC: 8 MG/DL (ref 8.8–10)
CHLORIDE SERPL-SCNC: 104 MMOL/L (ref 98–107)
CO2 SERPL-SCNC: 26 MMOL/L (ref 23–31)
CREAT SERPL-MCNC: 1.12 MG/DL (ref 0.73–1.18)
GFR SERPLBLD CREATININE-BSD FMLA CKD-EPI: >60 MLS/MIN/1.73/M2
GLOBULIN SER-MCNC: 2.5 GM/DL (ref 2.4–3.5)
GLUCOSE SERPL-MCNC: 143 MG/DL (ref 82–115)
POTASSIUM SERPL-SCNC: 3.2 MMOL/L (ref 3.5–5.1)
PROT SERPL-MCNC: 5 GM/DL (ref 5.8–7.6)
SODIUM SERPL-SCNC: 138 MMOL/L (ref 136–145)

## 2022-11-29 PROCEDURE — 25000003 PHARM REV CODE 250: Performed by: INTERNAL MEDICINE

## 2022-11-29 PROCEDURE — 80053 COMPREHEN METABOLIC PANEL: CPT | Performed by: STUDENT IN AN ORGANIZED HEALTH CARE EDUCATION/TRAINING PROGRAM

## 2022-11-29 PROCEDURE — 63600175 PHARM REV CODE 636 W HCPCS: Performed by: INTERNAL MEDICINE

## 2022-11-29 PROCEDURE — 21400001 HC TELEMETRY ROOM

## 2022-11-29 PROCEDURE — 25000003 PHARM REV CODE 250: Performed by: EMERGENCY MEDICINE

## 2022-11-29 PROCEDURE — 36415 COLL VENOUS BLD VENIPUNCTURE: CPT | Performed by: STUDENT IN AN ORGANIZED HEALTH CARE EDUCATION/TRAINING PROGRAM

## 2022-11-29 PROCEDURE — 63600175 PHARM REV CODE 636 W HCPCS: Performed by: STUDENT IN AN ORGANIZED HEALTH CARE EDUCATION/TRAINING PROGRAM

## 2022-11-29 PROCEDURE — 25000003 PHARM REV CODE 250: Performed by: PHYSICIAN ASSISTANT

## 2022-11-29 PROCEDURE — 25000242 PHARM REV CODE 250 ALT 637 W/ HCPCS: Performed by: NURSE PRACTITIONER

## 2022-11-29 PROCEDURE — 63600175 PHARM REV CODE 636 W HCPCS: Performed by: EMERGENCY MEDICINE

## 2022-11-29 PROCEDURE — 94640 AIRWAY INHALATION TREATMENT: CPT

## 2022-11-29 PROCEDURE — 94761 N-INVAS EAR/PLS OXIMETRY MLT: CPT

## 2022-11-29 RX ORDER — FAMOTIDINE 20 MG/1
20 TABLET, FILM COATED ORAL 2 TIMES DAILY
Status: DISCONTINUED | OUTPATIENT
Start: 2022-11-29 | End: 2022-11-30 | Stop reason: HOSPADM

## 2022-11-29 RX ORDER — TAMSULOSIN HYDROCHLORIDE 0.4 MG/1
1 CAPSULE ORAL DAILY
Status: DISCONTINUED | OUTPATIENT
Start: 2022-11-29 | End: 2022-11-30 | Stop reason: HOSPADM

## 2022-11-29 RX ORDER — LISINOPRIL 5 MG/1
5 TABLET ORAL DAILY
Status: DISCONTINUED | OUTPATIENT
Start: 2022-11-29 | End: 2022-11-30 | Stop reason: HOSPADM

## 2022-11-29 RX ADMIN — HEPARIN SODIUM 5000 UNITS: 5000 INJECTION, SOLUTION INTRAVENOUS; SUBCUTANEOUS at 09:11

## 2022-11-29 RX ADMIN — TAMSULOSIN HYDROCHLORIDE 0.4 MG: 0.4 CAPSULE ORAL at 09:11

## 2022-11-29 RX ADMIN — ATORVASTATIN CALCIUM 10 MG: 10 TABLET, FILM COATED ORAL at 09:11

## 2022-11-29 RX ADMIN — ACETAMINOPHEN 650 MG: 325 TABLET ORAL at 09:11

## 2022-11-29 RX ADMIN — IPRATROPIUM BROMIDE AND ALBUTEROL SULFATE 3 ML: 2.5; .5 SOLUTION RESPIRATORY (INHALATION) at 08:11

## 2022-11-29 RX ADMIN — IPRATROPIUM BROMIDE AND ALBUTEROL SULFATE 3 ML: 2.5; .5 SOLUTION RESPIRATORY (INHALATION) at 05:11

## 2022-11-29 RX ADMIN — LISINOPRIL 5 MG: 5 TABLET ORAL at 09:11

## 2022-11-29 RX ADMIN — PIPERACILLIN AND TAZOBACTAM 4.5 G: 4; .5 INJECTION, POWDER, LYOPHILIZED, FOR SOLUTION INTRAVENOUS; PARENTERAL at 12:11

## 2022-11-29 RX ADMIN — ACETAMINOPHEN 650 MG: 325 TABLET ORAL at 05:11

## 2022-11-29 RX ADMIN — HYDRALAZINE HYDROCHLORIDE 15 MG: 20 INJECTION INTRAMUSCULAR; INTRAVENOUS at 05:11

## 2022-11-29 RX ADMIN — IPRATROPIUM BROMIDE AND ALBUTEROL SULFATE 3 ML: 2.5; .5 SOLUTION RESPIRATORY (INHALATION) at 12:11

## 2022-11-29 RX ADMIN — PIPERACILLIN AND TAZOBACTAM 4.5 G: 4; .5 INJECTION, POWDER, LYOPHILIZED, FOR SOLUTION INTRAVENOUS; PARENTERAL at 06:11

## 2022-11-29 RX ADMIN — FAMOTIDINE 20 MG: 20 TABLET, FILM COATED ORAL at 12:11

## 2022-11-29 RX ADMIN — AMLODIPINE BESYLATE 10 MG: 5 TABLET ORAL at 09:11

## 2022-11-29 RX ADMIN — FAMOTIDINE 20 MG: 20 TABLET, FILM COATED ORAL at 09:11

## 2022-11-29 RX ADMIN — PIPERACILLIN AND TAZOBACTAM 4.5 G: 4; .5 INJECTION, POWDER, LYOPHILIZED, FOR SOLUTION INTRAVENOUS; PARENTERAL at 09:11

## 2022-11-29 NOTE — PLAN OF CARE
11/29/22 1049   Discharge Assessment   Assessment Type Discharge Planning Assessment   Source of Information patient;family   Reason For Admission acute cholecystitis   Lives With spouse   Do you expect to return to your current living situation? Yes   Do you have help at home or someone to help you manage your care at home? Yes   Who are your caregiver(s) and their phone number(s)? luz maria Mcdaniels, [phone]   Prior to hospitilization cognitive status: Alert/Oriented;No Deficits   Current cognitive status: Alert/Oriented;No Deficits   Walking or Climbing Stairs Difficulty none   Dressing/Bathing Difficulty none   Equipment Currently Used at Home CPAP   Do you currently have service(s) that help you manage your care at home? No   Who is going to help you get home at discharge? Pt daughter Munira   How do you get to doctors appointments? car, drives self;family or friend will provide   Are you on dialysis? No   Do you take coumadin? No   Discharge Plan A Home with family   Discharge Plan B Home with family   Discharge Plan discussed with: Spouse/sig other;Patient   Name(s) and Number(s) luz maria Mcdaniels, [phone]   Discharge Barriers Identified None   Pt states he lives with wife in single level home. There are 3 steps to enter with no railings. Pt states he is independent with ADL's prior to admission. Pt does not currently have home health services. Will follow for discharge needs.

## 2022-11-29 NOTE — PROGRESS NOTES
[facility]   Progress Note  Trauma and Acute Care Surgery    Patient Name: Blake Neville  YOB: 1945  Date of Admission: 11/25/2022  Date: 11/29/2022 6:47 AM    HD#3  POD#1 Day Post-Op    Interval   NAEON  AF  SBP up to 180s, consistently hypertensive since admission  All other VSS  Resting comfortably this AM     PAST HISTORY:   Past medical history:  History reviewed. No pertinent past medical history.    Past surgical history:  No past surgical history on file.    Family history:  No family history on file.    Social history:  Social History     Socioeconomic History    Marital status:    Substance and Sexual Activity    Alcohol use: Not Currently    Drug use: Not Currently    Sexual activity: Yes     Social History     Tobacco Use   Smoking Status Not on file   Smokeless Tobacco Not on file         MEDICATIONS & ALLERGIES:   Allergies:   Review of patient's allergies indicates:   Allergen Reactions    Aspirin      asthma       No current facility-administered medications on file prior to encounter.     Current Outpatient Medications on File Prior to Encounter   Medication Sig    amLODIPine (NORVASC) 5 MG tablet Take 5 mg by mouth once daily.    HYDROcodone-acetaminophen (NORCO)  mg per tablet Take 1 tablet by mouth every 6 (six) hours as needed for Pain.    ondansetron (ZOFRAN) 4 MG tablet Take 1 tablet (4 mg total) by mouth every 6 (six) hours.    simvastatin (ZOCOR) 40 MG tablet Take 40 mg by mouth once daily.    tamsulosin (FLOMAX) 0.4 mg Cap Take 1 capsule by mouth once daily.       Scheduled Meds:   amLODIPine  10 mg Oral Daily    amLODIPine  2.5 mg Oral Once    atorvastatin  10 mg Oral Daily    famotidine  20 mg Oral BID    heparin (porcine)  5,000 Units Subcutaneous Q12H    lisinopriL  5 mg Oral Daily    piperacillin-tazobactam (ZOSYN) IVPB  4.5 g Intravenous Q8H    tamsulosin  1 capsule Oral Daily       Continuous Infusions:        PRN Meds:acetaminophen,  acetaminophen, albuterol-ipratropium, artificial tears, dextrose 10%, dextrose 10%, glucagon (human recombinant), glucose, glucose, hydrALAZINE, HYDROmorphone, morphine, ondansetron, ondansetron, polyethylene glycol, sodium chloride 0.9%    OBJECTIVE:     Vital Signs:  Temp:  [98.3 °F (36.8 °C)-99.1 °F (37.3 °C)] 98.7 °F (37.1 °C)  Pulse:  [64-91] 88  Resp:  [16-22] 20  SpO2:  [90 %-97 %] 91 %  BP: (147-188)/() 147/89  Body mass index is 30.13 kg/m².     I/O last 3 completed shifts:  In: 1385 [P.O.:585; I.V.:100; IV Piggyback:700]  Out: -   No intake/output data recorded.    Physical Exam:  GEN: NAD   NEURO: AAOx3   HEENT: Acephalic   RESP: No increased WOB, equal rise and fall of the chest   CV: Regular rate   ABD: Soft, incisions c/d/I, appropriately TTP  EXT: Moving all extremities spontaneously     Laboratory:  Recent Labs     11/27/22 0936   WBC 15.9*   HGB 13.6*   HCT 40.9*          Recent Labs     11/27/22 0936 11/28/22  0852    140   K 3.5 3.3*   CO2 24 24   BUN 17.5 16.9   CREATININE 0.96 0.94   CALCIUM 9.0 8.4*   ALBUMIN 2.5* 2.4*   BILITOT 3.6* 2.4*   * 76*   ALKPHOS 280* 228*   * 200*       Troponin:  No results for input(s): TROPONINI in the last 72 hours.  CBC:  Recent Labs     11/27/22 0936   WBC 15.9*   RBC 4.47*   HGB 13.6*   HCT 40.9*      MCV 91.5   MCH 30.4   MCHC 33.3       CMP:  Recent Labs     11/27/22 0936 11/28/22  0852   CALCIUM 9.0 8.4*   ALBUMIN 2.5* 2.4*    140   K 3.5 3.3*   CO2 24 24   BUN 17.5 16.9   CREATININE 0.96 0.94   ALKPHOS 280* 228*   * 200*   * 76*   BILITOT 3.6* 2.4*       Lactic Acid:  No results for input(s): LACTATE in the last 72 hours.  Etoh:  No results for input(s): ALCOHOLMEDIC in the last 72 hours.  Drug Screen:  No results for input(s): PCDSCOMETHA, COCAINEMETAB, OPIATESCREEN, BARBITURATES, AMPHETAMINES, MARIJUANATHC, PCDSOPHENCYN, CREATRANDUR, TOXINFO in the last 72 hours.    ABG:  No results for  input(s): PH, PCO2, PO2, HCO3, BE, POCSATURATED in the last 72 hours.    Diagnostic Results:  No results found in the last 24 hours.  X-Ray Chest 1 View   Final Result      Atelectatic changes.      Otherwise no active pulmonary disease         Electronically signed by: Reinier Robins   Date:    11/29/2022   Time:    08:34      MRI MRCP Without Contrast   Final Result      1. Mild nonspecific gallbladder wall edema and trace pericholecystic fluid with no gallstones seen.   2. Mild extrahepatic biliary ductal dilatation.  A filling defect within the proximal common bile duct is favored artifactual.  No distal common bile duct filling defects seen.   3. Mild acute interstitial pancreatitis.   4. Trace ascites.         Electronically signed by: Prabhakar Nichols   Date:    11/26/2022   Time:    10:41          Microbiology:  Microbiology Results (last 7 days)       ** No results found for the last 168 hours. **             ASSESSMENT & PLAN:   Blake Neville is a 77 y.o. male who presented to the emergency department with cholelithiasis and leukocytosis following recent pancreatitis episode early this week. US findings also showed concern for a possible gallbladder mass at OSH.     Plan:  - ADAT  - Follow up labs  - Rest of care per primary    Gen Florian MD  [facility] General Surgery PGY1  11/29/2022  6:47 AM

## 2022-11-29 NOTE — PROGRESS NOTES
[facility] Hospital Medicine Progress Note        Chief Complaint: Inpatient Follow-up for cholecystitis    HPI:   Blake Neville is a 77 y.o. White male with a past medical history of hypertension, hyperlipidemia, BPH and kidney cancer status post right nephrectomy three and a half years ago. The patient presented to [facility] on 11/25/2022 as a transfer from [facility] ED with acute cholecystitis needing General surgery services.  Patient was initially seen in the ED on 11/22/2022 with complaints of upper abdominal pain and vomiting and diagnosed with pancreatitis.  He was sent home with pain medication. Patient returned to outside facility ED yesterday (11/25/2022) with complaints right-sided abdominal pain.  Patient reports abdominal pain mostly located to lower abdomen with radiation to the right flank.  He describes pain as intermittent, worsening with movement or coughing and sharp in nature.  Wife at bedside reports patient has been mostly taking in water since Tuesday due to vomiting.  Associated symptoms include low-grade fever of 99° F, burning with urination and sore throat with cough.  He denies complaints of shortness of breath, chest pain, diarrhea and prior pancreatitis.  His last bowel movement was on 11/21/2022 and normal.  He reports drinking alcohol once every 2 weeks.     Upon presentation to the ED, patient afebrile, hypertensive 174/88 and SpO2 95% on room air.  Labs revealed WBC 19.5, glucose 165, AST 66.  CA 19-9 elevated at 66.41.  CT abdomen and pelvis revealed findings consistent with pancreatitis unchanged since prior exam without evidence of necrotizing pancreatitis, hepatic steatosis, small amount of perihepatic fluid which is a new finding and most likely reactive, bibasilar atelectasis and left lower lobe punctate nodule with recommended follow-up in 6-12 months.  Right upper quadrant abdominal ultrasound revealed an abnormal appearing gallbladder, some  gallstones in the gallbladder with a questionable mass in the gallbladder, hepatomegaly with findings consistent with hepatic steatosis and pancreas not visualized due to overlying bowel gas.  At outside facility patient was started on Zosyn and received Zofran and morphine.  Patient was transferred to [facility]. General surgery consulted and recommended GI consult.  Patient admitted to hospital medicine services and further medical management.    GI and General surgery were consulted.  MRCP  showed Mild nonspecific gallbladder wall edema and trace pericholecystic fluid with no gallstones seen. No reports of a mass. Mild extrahepatic biliary ductal dilatation.  A filling defect within the proximal common bile duct is favored artifactual.  No distal common bile duct filling defects seen. Mild acute interstitial pancreatitis. ERCP was not suggested.  Patient scheduled for laparoscopic cholecystectomy on 11/28/2022.    Interval Hx:   No acute events overnight.  Blood pressure axillary this morning.  Afebrile and hemodynamically stable otherwise.  Denies any nausea or vomiting.  Ambulating well.  Tolerating diet well.  Wife was at bedside.    Had cholecystectomy yesterday.  Specimen was sent for pathology    Objective/physical exam:  Vitals:    11/29/22 0507 11/29/22 0530 11/29/22 0640 11/29/22 0848   BP:  (!) 167/75 (!) 165/78 (!) 147/89   Pulse: 84 90 91 88   Resp: 16   20   Temp:    98.7 °F (37.1 °C)   TempSrc:    Oral   SpO2:    (!) 91%   Weight:       Height:         General: In no acute distress, afebrile  Respiratory: Clear to auscultation bilaterally  Cardiovascular: S1, S2, no appreciable murmur  Abdomen: Soft, nontender, BS +  MSK: Warm, no lower extremity edema, no clubbing or cyanosis  Neurologic: Alert and oriented x4, moving all extremities with good strength     Lab Results   Component Value Date     11/28/2022    K 3.3 (L) 11/28/2022    CO2 24 11/28/2022    BUN 16.9 11/28/2022    CREATININE 0.94  11/28/2022    CALCIUM 8.4 (L) 11/28/2022      Lab Results   Component Value Date     (H) 11/28/2022    AST 76 (H) 11/28/2022    ALKPHOS 228 (H) 11/28/2022    BILITOT 2.4 (H) 11/28/2022      Lab Results   Component Value Date    WBC 15.9 (H) 11/27/2022    HGB 13.6 (L) 11/27/2022    HCT 40.9 (L) 11/27/2022    MCV 91.5 11/27/2022     11/27/2022           Medications:   amLODIPine  10 mg Oral Daily    amLODIPine  2.5 mg Oral Once    atorvastatin  10 mg Oral Daily    heparin (porcine)  5,000 Units Subcutaneous Q12H    lisinopriL  5 mg Oral Daily    piperacillin-tazobactam (ZOSYN) IVPB  4.5 g Intravenous Q8H    tamsulosin  1 capsule Oral Daily      acetaminophen, acetaminophen, albuterol-ipratropium, artificial tears, dextrose 10%, dextrose 10%, glucagon (human recombinant), glucose, glucose, hydrALAZINE, HYDROmorphone, morphine, ondansetron, ondansetron, polyethylene glycol, sodium chloride 0.9%     Assessment/Plan:    Acute cholecystitis s/p cholecystectomy 11/28/2022  Acute pancreatitis - resolving  Elevated CA 19-9   Uncontrolled hypertension  Hyperlipidemia  Obesity     Plan:   -continue postoperative care.  Follow up pathologies   -no cultures were obtained at admission.  Currently receiving Zosyn.  We will consider discontinuing antibiotics tomorrow.  -advance diet as tolerated.  Tylenol for pain.  Avoid narcotics.  Antiemetics if necessary  -low-salt diet.  Continue amlodipine.  Add lisinopril.  -other home medications will be reviewed and renewed    Heparin   Add Oliver Dietrich MD

## 2022-11-30 VITALS
HEIGHT: 70 IN | SYSTOLIC BLOOD PRESSURE: 183 MMHG | HEART RATE: 80 BPM | RESPIRATION RATE: 19 BRPM | BODY MASS INDEX: 30.06 KG/M2 | DIASTOLIC BLOOD PRESSURE: 82 MMHG | TEMPERATURE: 98 F | OXYGEN SATURATION: 93 % | WEIGHT: 210 LBS

## 2022-11-30 PROBLEM — R10.9 ABDOMINAL PAIN: Status: RESOLVED | Noted: 2022-11-30 | Resolved: 2022-11-30

## 2022-11-30 PROBLEM — R10.9 ABDOMINAL PAIN: Status: ACTIVE | Noted: 2022-11-30

## 2022-11-30 PROBLEM — K80.00 CHOLELITHIASIS WITH ACUTE CHOLECYSTITIS: Status: RESOLVED | Noted: 2022-11-30 | Resolved: 2022-11-30

## 2022-11-30 PROBLEM — K80.00 CHOLELITHIASIS WITH ACUTE CHOLECYSTITIS: Status: ACTIVE | Noted: 2022-11-30

## 2022-11-30 LAB
ABS NEUT (OLG): 11.9 X10(3)/MCL (ref 2.1–9.2)
ALBUMIN SERPL-MCNC: 2.1 GM/DL (ref 3.4–4.8)
ALBUMIN/GLOB SERPL: 0.7 RATIO (ref 1.1–2)
ALP SERPL-CCNC: 155 UNIT/L (ref 40–150)
ALT SERPL-CCNC: 91 UNIT/L (ref 0–55)
ANISOCYTOSIS BLD QL SMEAR: ABNORMAL
AST SERPL-CCNC: 31 UNIT/L (ref 5–34)
BILIRUBIN DIRECT+TOT PNL SERPL-MCNC: 1.3 MG/DL
BUN SERPL-MCNC: 15.8 MG/DL (ref 8.4–25.7)
BURR CELLS (OLG): ABNORMAL
CALCIUM SERPL-MCNC: 8.1 MG/DL (ref 8.8–10)
CHLORIDE SERPL-SCNC: 105 MMOL/L (ref 98–107)
CO2 SERPL-SCNC: 22 MMOL/L (ref 23–31)
CREAT SERPL-MCNC: 1.03 MG/DL (ref 0.73–1.18)
EOSINOPHIL NFR BLD MANUAL: 0.14 X10(3)/MCL (ref 0–0.9)
EOSINOPHIL NFR BLD MANUAL: 1 %
ERYTHROCYTE [DISTWIDTH] IN BLOOD BY AUTOMATED COUNT: 15.5 % (ref 11.5–17)
EST. AVERAGE GLUCOSE BLD GHB EST-MCNC: 137 MG/DL
ESTROGEN SERPL-MCNC: NORMAL PG/ML
GFR SERPLBLD CREATININE-BSD FMLA CKD-EPI: >60 MLS/MIN/1.73/M2
GLOBULIN SER-MCNC: 3.1 GM/DL (ref 2.4–3.5)
GLUCOSE SERPL-MCNC: 145 MG/DL (ref 82–115)
HBA1C MFR BLD: 6.4 %
HCT VFR BLD AUTO: 36.9 % (ref 42–52)
HGB BLD-MCNC: 12.1 GM/DL (ref 14–18)
IMM GRANULOCYTES # BLD AUTO: 0.73 X10(3)/MCL (ref 0–0.04)
IMM GRANULOCYTES NFR BLD AUTO: 5.1 %
INSTRUMENT WBC (OLG): 14 X10(3)/MCL
INSULIN SERPL-ACNC: NORMAL U[IU]/ML
LAB AP CLINICAL INFORMATION: NORMAL
LAB AP GROSS DESCRIPTION: NORMAL
LAB AP REPORT FOOTNOTES: NORMAL
LYMPHOCYTES NFR BLD MANUAL: 1.4 X10(3)/MCL
LYMPHOCYTES NFR BLD MANUAL: 10 %
MACROCYTES BLD QL SMEAR: ABNORMAL
MAGNESIUM SERPL-MCNC: 2.1 MG/DL (ref 1.6–2.6)
MCH RBC QN AUTO: 30.4 PG (ref 27–31)
MCHC RBC AUTO-ENTMCNC: 32.8 MG/DL (ref 33–36)
MCV RBC AUTO: 92.7 FL (ref 80–94)
MONOCYTES NFR BLD MANUAL: 0.7 X10(3)/MCL (ref 0.1–1.3)
MONOCYTES NFR BLD MANUAL: 5 %
MYELOCYTES NFR BLD MANUAL: 1 %
NEUTROPHILS NFR BLD MANUAL: 84 %
NRBC BLD AUTO-RTO: 0 %
PLATELET # BLD AUTO: 262 X10(3)/MCL (ref 130–400)
PLATELET # BLD EST: NORMAL 10*3/UL
PMV BLD AUTO: 10.7 FL (ref 7.4–10.4)
POIKILOCYTOSIS BLD QL SMEAR: ABNORMAL
POTASSIUM SERPL-SCNC: 3.2 MMOL/L (ref 3.5–5.1)
PROT SERPL-MCNC: 5.2 GM/DL (ref 5.8–7.6)
RBC # BLD AUTO: 3.98 X10(6)/MCL (ref 4.7–6.1)
RBC MORPH BLD: ABNORMAL
SODIUM SERPL-SCNC: 137 MMOL/L (ref 136–145)
T3RU NFR SERPL: NORMAL %
TARGETS BLD QL SMEAR: ABNORMAL
WBC # SPEC AUTO: 14.3 X10(3)/MCL (ref 4.5–11.5)

## 2022-11-30 PROCEDURE — 80053 COMPREHEN METABOLIC PANEL: CPT | Performed by: INTERNAL MEDICINE

## 2022-11-30 PROCEDURE — 85027 COMPLETE CBC AUTOMATED: CPT | Performed by: INTERNAL MEDICINE

## 2022-11-30 PROCEDURE — 83036 HEMOGLOBIN GLYCOSYLATED A1C: CPT | Performed by: INTERNAL MEDICINE

## 2022-11-30 PROCEDURE — 63600175 PHARM REV CODE 636 W HCPCS: Performed by: INTERNAL MEDICINE

## 2022-11-30 PROCEDURE — 25000003 PHARM REV CODE 250: Performed by: INTERNAL MEDICINE

## 2022-11-30 PROCEDURE — 83735 ASSAY OF MAGNESIUM: CPT | Performed by: INTERNAL MEDICINE

## 2022-11-30 PROCEDURE — 25000003 PHARM REV CODE 250: Performed by: EMERGENCY MEDICINE

## 2022-11-30 PROCEDURE — 85025 COMPLETE CBC W/AUTO DIFF WBC: CPT | Performed by: INTERNAL MEDICINE

## 2022-11-30 PROCEDURE — 25000003 PHARM REV CODE 250: Performed by: PHYSICIAN ASSISTANT

## 2022-11-30 PROCEDURE — 63600175 PHARM REV CODE 636 W HCPCS: Performed by: EMERGENCY MEDICINE

## 2022-11-30 PROCEDURE — 36415 COLL VENOUS BLD VENIPUNCTURE: CPT | Performed by: INTERNAL MEDICINE

## 2022-11-30 RX ORDER — POLYETHYLENE GLYCOL 3350 17 G/17G
17 POWDER, FOR SOLUTION ORAL DAILY PRN
Qty: 15 PACKET | Refills: 0 | Status: SHIPPED | OUTPATIENT
Start: 2022-11-30 | End: 2022-12-30

## 2022-11-30 RX ORDER — POTASSIUM CHLORIDE 20 MEQ/1
20 TABLET, EXTENDED RELEASE ORAL ONCE
Status: COMPLETED | OUTPATIENT
Start: 2022-11-30 | End: 2022-11-30

## 2022-11-30 RX ORDER — TAMSULOSIN HYDROCHLORIDE 0.4 MG/1
1 CAPSULE ORAL DAILY
Qty: 30 CAPSULE | Refills: 2 | Status: SHIPPED | OUTPATIENT
Start: 2022-11-30 | End: 2023-02-28

## 2022-11-30 RX ORDER — LISINOPRIL 5 MG/1
10 TABLET ORAL DAILY
Qty: 180 TABLET | Refills: 3 | Status: SHIPPED | OUTPATIENT
Start: 2022-12-01 | End: 2022-11-30

## 2022-11-30 RX ORDER — AMLODIPINE BESYLATE 10 MG/1
10 TABLET ORAL DAILY
Qty: 30 TABLET | Refills: 11 | Status: SHIPPED | OUTPATIENT
Start: 2022-11-30 | End: 2023-11-30

## 2022-11-30 RX ORDER — LISINOPRIL 10 MG/1
10 TABLET ORAL DAILY
Qty: 90 TABLET | Refills: 3 | Status: SHIPPED | OUTPATIENT
Start: 2022-11-30 | End: 2023-11-30

## 2022-11-30 RX ORDER — SIMVASTATIN 40 MG/1
40 TABLET, FILM COATED ORAL NIGHTLY
Qty: 90 TABLET | Refills: 0 | Status: SHIPPED | OUTPATIENT
Start: 2022-11-30 | End: 2023-02-28

## 2022-11-30 RX ORDER — AMLODIPINE BESYLATE 10 MG/1
10 TABLET ORAL DAILY
Qty: 30 TABLET | Refills: 11 | Status: SHIPPED | OUTPATIENT
Start: 2022-12-01 | End: 2022-11-30

## 2022-11-30 RX ADMIN — HEPARIN SODIUM 5000 UNITS: 5000 INJECTION, SOLUTION INTRAVENOUS; SUBCUTANEOUS at 08:11

## 2022-11-30 RX ADMIN — POTASSIUM CHLORIDE 20 MEQ: 1500 TABLET, EXTENDED RELEASE ORAL at 08:11

## 2022-11-30 RX ADMIN — TAMSULOSIN HYDROCHLORIDE 0.4 MG: 0.4 CAPSULE ORAL at 08:11

## 2022-11-30 RX ADMIN — FAMOTIDINE 20 MG: 20 TABLET, FILM COATED ORAL at 08:11

## 2022-11-30 RX ADMIN — AMLODIPINE BESYLATE 10 MG: 5 TABLET ORAL at 08:11

## 2022-11-30 RX ADMIN — PIPERACILLIN AND TAZOBACTAM 4.5 G: 4; .5 INJECTION, POWDER, LYOPHILIZED, FOR SOLUTION INTRAVENOUS; PARENTERAL at 03:11

## 2022-11-30 RX ADMIN — ATORVASTATIN CALCIUM 10 MG: 10 TABLET, FILM COATED ORAL at 08:11

## 2022-11-30 RX ADMIN — LISINOPRIL 5 MG: 5 TABLET ORAL at 08:11

## 2022-11-30 RX ADMIN — ACETAMINOPHEN 650 MG: 325 TABLET ORAL at 04:11

## 2022-11-30 NOTE — DISCHARGE SUMMARY
[facility] - 8th Floor [facility] Medicine  Discharge Summary      Patient Name: Blake Neville  MRN: [id number]  [facility]: [id number]  Patient Class: IP- Inpatient  Admission Date: 11/25/2022  Hospital Length of Stay: 4 days  Discharge Date and Time:  11/30/2022 8:50 AM  Attending Physician: Jerry Pineda MD   Discharging Provider: Zafar Dietrich MD  Primary Care Provider: Bryant Rhodes MD    Primary Care Team: Networked reference to record PCT   Blake Neville is a 77 y.o. White male with a past medical history of hypertension, hyperlipidemia, BPH and kidney cancer status post right nephrectomy three and a half years ago. The patient presented to [facility] on 11/25/2022 as a transfer from [facility] ED with acute cholecystitis needing General surgery services.  Patient was initially seen in the ED on 11/22/2022 with complaints of upper abdominal pain and vomiting and diagnosed with pancreatitis.  He was sent home with pain medication. Patient returned to outside facility ED yesterday (11/25/2022) with complaints right-sided abdominal pain.  Patient reports abdominal pain mostly located to lower abdomen with radiation to the right flank.  He describes pain as intermittent, worsening with movement or coughing and sharp in nature.  Wife at bedside reports patient has been mostly taking in water since Tuesday due to vomiting.  Associated symptoms include low-grade fever of 99° F, burning with urination and sore throat with cough.  He denies complaints of shortness of breath, chest pain, diarrhea and prior pancreatitis.  His last bowel movement was on 11/21/2022 and normal.  He reports drinking alcohol once every 2 weeks.     Upon presentation to the ED, patient afebrile, hypertensive 174/88 and SpO2 95% on room air.  Labs revealed WBC 19.5, glucose 165, AST 66.  CA 19-9 elevated at 66.41.  CT abdomen and pelvis revealed findings consistent with pancreatitis unchanged since prior exam  without evidence of necrotizing pancreatitis, hepatic steatosis, small amount of perihepatic fluid which is a new finding and most likely reactive, bibasilar atelectasis and left lower lobe punctate nodule with recommended follow-up in 6-12 months.  Right upper quadrant abdominal ultrasound revealed an abnormal appearing gallbladder, some gallstones in the gallbladder with a questionable mass in the gallbladder, hepatomegaly with findings consistent with hepatic steatosis and pancreas not visualized due to overlying bowel gas.  At outside facility patient was started on Zosyn and received Zofran and morphine.  Patient was transferred to [facility]. General surgery consulted and recommended GI consult.  Patient admitted to hospital medicine services and further medical management.     GI and General surgery were consulted.  MRCP  showed Mild nonspecific gallbladder wall edema and trace pericholecystic fluid with no gallstones seen. No reports of a mass. Mild extrahepatic biliary ductal dilatation.  A filling defect within the proximal common bile duct is favored artifactual.  No distal common bile duct filling defects seen. Mild acute interstitial pancreatitis. ERCP was not suggested.  Patient scheduled for laparoscopic cholecystectomy on 11/28/2022.  P.o. intake advanced as tolerated.  Encourage incentive spirometer use.  Blood pressure medications were reconciled.  He will be discharged to home today.      Acute cholecystitis s/p cholecystectomy 11/28/2022  Acute pancreatitis - resolving  Elevated CA 19-9   Uncontrolled hypertension  Hyperlipidemia  Obesity           Procedure(s) (LRB):  CHOLECYSTECTOMY, LAPAROSCOPIC (N/A)            Goals of Care Treatment Preferences:  Code Status: Full Code      Consults:   Consults (From admission, onward)          Status Ordering Provider     Inpatient consult to General Surgery  Once        Provider:  Camron English Jr., MD    Acknowledged AP WHITING     Inpatient  consult to Gastroenterology  Once        Provider:  Chris Valdez MD    Acknowledged AP WHITING     Inpatient consult to General surgery  Once        Provider:  Camron English Jr., MD    Completed INA MAURICIO            No new Assessment & Plan notes have been filed under this hospital service since the last note was generated.  Service: Hospital Medicine    Final Active Diagnoses:      Problems Resolved During this Admission:    Diagnosis Date Noted Date Resolved POA    PRINCIPAL PROBLEM:  Cholelithiasis with acute cholecystitis [K80.00] 11/30/2022 11/30/2022 Yes    Abdominal pain [R10.9] 11/30/2022 11/30/2022 Yes       Discharged Condition: good    Disposition:  home    Follow Up:   Follow-up Information       [facility] Follow up.    Why: Office will call you with a telemed visit on 12/6/22. Please call with any questions or concerns  Contact information:  [address] [id number] [phone]                           Patient Instructions:   No discharge procedures on file.    Significant Diagnostic Studies: Labs: CMP   Recent Labs   Lab 11/29/22  1656 11/30/22  0406    137   K 3.2* 3.2*   CO2 26 22*   BUN 17.8 15.8   CREATININE 1.12 1.03   CALCIUM 8.0* 8.1*   ALBUMIN 2.5* 2.1*   BILITOT 1.6* 1.3   ALKPHOS 171* 155*   AST 41* 31   * 91*       Pending Diagnostic Studies:       Procedure Component Value Units Date/Time    Comprehensive Metabolic Panel [[id number]]     Order Status: Sent Lab Status: No result     Specimen: Blood     X-Ray Chest 1 View [[id number]] Resulted: 11/30/22 0920    Order Status: Sent Lab Status: In process Updated: 11/30/22 1012           Medications:  Reconciled Home Medications:      Medication List        START taking these medications      lisinopriL 5 MG tablet  Commonly known as: PRINIVIL,ZESTRIL  Take 2 tablets (10 mg total) by mouth once daily.  Start taking on: December 1, 2022     polyethylene glycol 17 gram  Pwpk  Commonly known as: GLYCOLAX  Take 17 g by mouth daily as needed (constipation).            CHANGE how you take these medications      amLODIPine 10 MG tablet  Commonly known as: NORVASC  Take 1 tablet (10 mg total) by mouth once daily.  Start taking on: December 1, 2022  What changed:   medication strength  how much to take     simvastatin 40 MG tablet  Commonly known as: ZOCOR  Take 1 tablet (40 mg total) by mouth every evening.  What changed: when to take this            CONTINUE taking these medications      HYDROcodone-acetaminophen  mg per tablet  Commonly known as: NORCO  Take 1 tablet by mouth every 6 (six) hours as needed for Pain.     tamsulosin 0.4 mg Cap  Commonly known as: FLOMAX  Take 1 capsule (0.4 mg total) by mouth once daily.            STOP taking these medications      ondansetron 4 MG tablet  Commonly known as: ZOFRAN              Indwelling Lines/Drains at time of discharge:   Lines/Drains/Airways       None                   Time spent on the discharge of patient: 35 minutes         Zafar Dietrich MD  Department of Hospital Medicine  [facility] - 8th Floor Med Surg

## 2022-11-30 NOTE — PLAN OF CARE
02/11/21 1559   C-SSRS (Frequent Screen)   2. Have you actually had any thoughts of killing yourself? Yes   3. Have you been thinking about how you might do this? No   4. Have you had these thoughts and had some intention of acting on them? No   5. Have you started to work out or worked out the details of how to kill yourself? Do you intend to carry out this plan?  No   6. Have you done anything, started to do anything, or prepared to do anything to end your life? No   Suicide Evaluation Low Risk   Nursing Suicide Assessment Note - Inpatient    Current assessment:Pt endorses passive SI with no plan or intent, as well as passive self harm thoughts, with no plan or intent and will alert staff if he intends to act upon these thoughts.     Current C-SSRS score: (P) Low Risk      Protective Factors / Reason for Living: Responsibility to children    Interventions:   · q 15 min safety checks    Other Interventions Implemented:  Visual inspection of patient's environment completed. Items removed: none   Labs reviewed. T bili and liver enzymes down trending. No further surgical intervention required at this time. Will sign off. Please call [facility] any questions or concerns.     Gen Florian MD  [employer] General Surgery PGY-1

## 2022-12-02 ENCOUNTER — TELEPHONE (OUTPATIENT)
Dept: MEDSURG UNIT | Facility: HOSPITAL | Age: 77
End: 2022-12-02
Payer: MEDICARE

## 2022-12-02 NOTE — TELEPHONE ENCOUNTER
[facility] - Post discharge call    Pt called regarding a FU. He will have a telemed on 12/6/22. He is doing well. Ambulating, eating well, +BM. Incisions healing. No issues voiced at this time. I did go over his path and answered his questions. He would still like a call on the 6th. Enc him to call if needs and he verbalized understanding.

## 2022-12-06 ENCOUNTER — DOCUMENTATION ONLY (OUTPATIENT)
Dept: SURGERY | Facility: HOSPITAL | Age: 77
End: 2022-12-06
Payer: MEDICARE

## 2022-12-06 NOTE — PROGRESS NOTES
Final Diagnosis   Gallbladder, cholecystectomy:       Cholelithiasis.     Acute and chronic cholecystitis, moderately severe, with inflamed diverticula.   [facility] Follow Up Note    Patient called and had no complaints. Tolerating a diet. Having bowel movements. Pathology reviewed. Post-op care discussed. All questions answered. Call as need.      Has PCP visit tomorrow AM.     Altaf Huang

## (undated) DEVICE — IRRIGATOR SUCTION W/TIP

## (undated) DEVICE — WARMER DRAPE STERILE LF

## (undated) DEVICE — BAG SPEC RETRV ENDO 4X6IN DISP

## (undated) DEVICE — SCALPEL #11 BLADE STRL DISP

## (undated) DEVICE — SCISSOR CURVED ENDOPATH 5MM

## (undated) DEVICE — SUT VICRYL PLUS 4-0 FS-2 27IN

## (undated) DEVICE — KIT GEN LAPAROSCOPY LAFAYETTE

## (undated) DEVICE — ELECTRODE PATIENT RETURN DISP

## (undated) DEVICE — NDL INSUF ULTRA VERESS 120MM

## (undated) DEVICE — KIT SURGIFLO HEMOSTATIC MATRIX

## (undated) DEVICE — SUT VICRYL+ 27 UR-6 VIOL

## (undated) DEVICE — BLADE SURG STAINLESS STEEL #11

## (undated) DEVICE — KIT SURGICAL TURNOVER

## (undated) DEVICE — CORD LAP 10 DISP

## (undated) DEVICE — GLOVE PROTEXIS LTX MICRO  7.5

## (undated) DEVICE — TROCAR ENDOPATH XCEL 5X100MM

## (undated) DEVICE — ADHESIVE DERMABOND ADVANCED

## (undated) DEVICE — PASSER SUT SUREGRSP 10-12X15MM

## (undated) DEVICE — CANNULA ENDOPATH XCEL 5X100MM

## (undated) DEVICE — APPLICATOR ENDOSCOPIC

## (undated) DEVICE — TROCAR ENDOPATH XCEL 11MM 10CM

## (undated) DEVICE — SOL IRRI STRL WATER 1000ML

## (undated) DEVICE — APPLIER CLIP ENDO MED/LG 10MM

## (undated) DEVICE — NDL HYPO 22GX1 1/2 SYR 10ML LL